# Patient Record
Sex: FEMALE | Race: BLACK OR AFRICAN AMERICAN | NOT HISPANIC OR LATINO | URBAN - METROPOLITAN AREA
[De-identification: names, ages, dates, MRNs, and addresses within clinical notes are randomized per-mention and may not be internally consistent; named-entity substitution may affect disease eponyms.]

---

## 2022-03-27 ENCOUNTER — INPATIENT (INPATIENT)
Facility: HOSPITAL | Age: 47
LOS: 3 days | Discharge: HOME | DRG: 312 | End: 2022-03-31
Attending: INTERNAL MEDICINE | Admitting: INTERNAL MEDICINE
Payer: MEDICARE

## 2022-03-27 VITALS
RESPIRATION RATE: 18 BRPM | TEMPERATURE: 98 F | DIASTOLIC BLOOD PRESSURE: 95 MMHG | OXYGEN SATURATION: 100 % | HEART RATE: 106 BPM | HEIGHT: 66 IN | SYSTOLIC BLOOD PRESSURE: 151 MMHG | WEIGHT: 175.05 LBS

## 2022-03-27 LAB
AMPHET UR-MCNC: NEGATIVE — SIGNIFICANT CHANGE UP
ANION GAP SERPL CALC-SCNC: 9 MMOL/L — SIGNIFICANT CHANGE UP (ref 9–16)
BARBITURATES UR SCN-MCNC: NEGATIVE — SIGNIFICANT CHANGE UP
BASOPHILS # BLD AUTO: 0.02 K/UL — SIGNIFICANT CHANGE UP (ref 0–0.2)
BASOPHILS NFR BLD AUTO: 0.2 % — SIGNIFICANT CHANGE UP (ref 0–2)
BENZODIAZ UR-MCNC: NEGATIVE — SIGNIFICANT CHANGE UP
BUN SERPL-MCNC: 12 MG/DL — SIGNIFICANT CHANGE UP (ref 7–23)
CALCIUM SERPL-MCNC: 9 MG/DL — SIGNIFICANT CHANGE UP (ref 8.5–10.5)
CHLORIDE SERPL-SCNC: 106 MMOL/L — SIGNIFICANT CHANGE UP (ref 96–108)
CO2 SERPL-SCNC: 26 MMOL/L — SIGNIFICANT CHANGE UP (ref 22–31)
COCAINE METAB.OTHER UR-MCNC: NEGATIVE — SIGNIFICANT CHANGE UP
CREAT SERPL-MCNC: 0.96 MG/DL — SIGNIFICANT CHANGE UP (ref 0.5–1.3)
D DIMER BLD IA.RAPID-MCNC: 607 NG/ML DDU — HIGH
EGFR: 73 ML/MIN/1.73M2 — SIGNIFICANT CHANGE UP
EOSINOPHIL # BLD AUTO: 0.02 K/UL — SIGNIFICANT CHANGE UP (ref 0–0.5)
EOSINOPHIL NFR BLD AUTO: 0.2 % — SIGNIFICANT CHANGE UP (ref 0–6)
ETHANOL SERPL-MCNC: <3 MG/DL — SIGNIFICANT CHANGE UP
GLUCOSE SERPL-MCNC: 102 MG/DL — HIGH (ref 70–99)
HCG SERPL-ACNC: <1 MIU/ML — SIGNIFICANT CHANGE UP
HCT VFR BLD CALC: 31.2 % — LOW (ref 34.5–45)
HGB BLD-MCNC: 9.6 G/DL — LOW (ref 11.5–15.5)
IMM GRANULOCYTES NFR BLD AUTO: 0.2 % — SIGNIFICANT CHANGE UP (ref 0–1.5)
LYMPHOCYTES # BLD AUTO: 1.1 K/UL — SIGNIFICANT CHANGE UP (ref 1–3.3)
LYMPHOCYTES # BLD AUTO: 11.7 % — LOW (ref 13–44)
MAGNESIUM SERPL-MCNC: 1.8 MG/DL — SIGNIFICANT CHANGE UP (ref 1.6–2.6)
MCHC RBC-ENTMCNC: 27.7 PG — SIGNIFICANT CHANGE UP (ref 27–34)
MCHC RBC-ENTMCNC: 30.8 GM/DL — LOW (ref 32–36)
MCV RBC AUTO: 90.2 FL — SIGNIFICANT CHANGE UP (ref 80–100)
METHADONE UR-MCNC: NEGATIVE — SIGNIFICANT CHANGE UP
MONOCYTES # BLD AUTO: 0.59 K/UL — SIGNIFICANT CHANGE UP (ref 0–0.9)
MONOCYTES NFR BLD AUTO: 6.3 % — SIGNIFICANT CHANGE UP (ref 2–14)
NEUTROPHILS # BLD AUTO: 7.66 K/UL — HIGH (ref 1.8–7.4)
NEUTROPHILS NFR BLD AUTO: 81.4 % — HIGH (ref 43–77)
NRBC # BLD: 0 /100 WBCS — SIGNIFICANT CHANGE UP (ref 0–0)
NT-PROBNP SERPL-SCNC: 120 PG/ML — SIGNIFICANT CHANGE UP
OPIATES UR-MCNC: NEGATIVE — SIGNIFICANT CHANGE UP
PCP SPEC-MCNC: SIGNIFICANT CHANGE UP
PCP UR-MCNC: NEGATIVE — SIGNIFICANT CHANGE UP
PLATELET # BLD AUTO: 207 K/UL — SIGNIFICANT CHANGE UP (ref 150–400)
POTASSIUM SERPL-MCNC: 3.4 MMOL/L — LOW (ref 3.5–5.3)
POTASSIUM SERPL-SCNC: 3.4 MMOL/L — LOW (ref 3.5–5.3)
RBC # BLD: 3.46 M/UL — LOW (ref 3.8–5.2)
RBC # FLD: 15.4 % — HIGH (ref 10.3–14.5)
SARS-COV-2 RNA SPEC QL NAA+PROBE: SIGNIFICANT CHANGE UP
SODIUM SERPL-SCNC: 141 MMOL/L — SIGNIFICANT CHANGE UP (ref 132–145)
THC UR QL: NEGATIVE — SIGNIFICANT CHANGE UP
TROPONIN I, HIGH SENSITIVITY RESULT: 13 NG/L — SIGNIFICANT CHANGE UP
TROPONIN I, HIGH SENSITIVITY RESULT: 14.8 NG/L — SIGNIFICANT CHANGE UP
TSH SERPL-MCNC: 2.48 UIU/ML — SIGNIFICANT CHANGE UP (ref 0.36–3.74)
WBC # BLD: 9.41 K/UL — SIGNIFICANT CHANGE UP (ref 3.8–10.5)
WBC # FLD AUTO: 9.41 K/UL — SIGNIFICANT CHANGE UP (ref 3.8–10.5)

## 2022-03-27 PROCEDURE — 71045 X-RAY EXAM CHEST 1 VIEW: CPT | Mod: 26

## 2022-03-27 PROCEDURE — 71275 CT ANGIOGRAPHY CHEST: CPT | Mod: 26,MH

## 2022-03-27 PROCEDURE — 93010 ELECTROCARDIOGRAM REPORT: CPT

## 2022-03-27 PROCEDURE — 70450 CT HEAD/BRAIN W/O DYE: CPT | Mod: 26,MH

## 2022-03-27 PROCEDURE — 99223 1ST HOSP IP/OBS HIGH 75: CPT

## 2022-03-27 PROCEDURE — 99285 EMERGENCY DEPT VISIT HI MDM: CPT | Mod: 25

## 2022-03-27 RX ORDER — POTASSIUM CHLORIDE 20 MEQ
40 PACKET (EA) ORAL ONCE
Refills: 0 | Status: COMPLETED | OUTPATIENT
Start: 2022-03-27 | End: 2022-03-27

## 2022-03-27 RX ORDER — POTASSIUM CHLORIDE 20 MEQ
10 PACKET (EA) ORAL ONCE
Refills: 0 | Status: COMPLETED | OUTPATIENT
Start: 2022-03-27 | End: 2022-03-27

## 2022-03-27 RX ADMIN — Medication 100 MILLIEQUIVALENT(S): at 20:59

## 2022-03-27 RX ADMIN — Medication 40 MILLIEQUIVALENT(S): at 20:58

## 2022-03-27 NOTE — ED ADULT TRIAGE NOTE - CHIEF COMPLAINT QUOTE
Pt BIBEMS from home complaining of syncope and dizziness. Pt with pmh of cardiomyopathy and lupus. Pt with baseline left sided weakness uses a walked to ambulate.

## 2022-03-27 NOTE — ED PROVIDER NOTE - PHYSICAL EXAMINATION
Constitutional: awake and alert, shivering and distressed appearing  HEENT: head normocephalic and atraumatic. moist mucous membranes  Eyes: extraocular movements intact, normal conjunctiva  Neck: supple, normal ROM  Cardiovascular: regular rhythm, slightly tachycardic, no murmur  Pulmonary: mildly short of breath lungs clear to auscultation bilaterally  Gastrointestinal: abdomen soft, nontender, nondistended, no rebound or guarding  Genitourinary: no CVA tenderness  Skin: warm, dry, normal for ethnicity  Musculoskeletal: no edema, no deformity  Neurological: oriented x4, 5/5 strength in all 4 extremities, sensation intact in all extremities. CN II-XII intact. no focal neurologic deficit  Psychiatric: appropriate mood and affect

## 2022-03-27 NOTE — ED PROVIDER NOTE - CLINICAL SUMMARY MEDICAL DECISION MAKING FREE TEXT BOX
Patient brought to the ED after being found unconscious in bathroom for what sounds like several hours, syncope vs seizure vs arrhythmia. She has a complex past medical history, much of which has been worked up at other hospitals in [facility], but it sounds like there's been some diagnostic uncertainty surrounding her underlying conditions. She is baseline tachycardic, slightly tachypneic, afebrile, but shivering violently upon arrival. Extensive workup with labs, EKG, CT head, XR chest ordered and will plan to admit for further monitoring and investigations.

## 2022-03-27 NOTE — ED PROVIDER NOTE - LAB INTERPRETATION
significant anemia. unknown if this is baseline for the patient. d-dimer elevated. mild hypokalemia. negative troponin
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
## 2022-03-27 NOTE — H&P ADULT - NSICDXPASTMEDICALHX_GEN_ALL_CORE_FT
PAST MEDICAL HISTORY:  Anemia     Asthma     Hypothyroidism     Lupus erythematosus     Neuropathy

## 2022-03-27 NOTE — H&P ADULT - NSHPSOCIALHISTORY_GEN_ALL_CORE
-never smoker, denies ETOH or illicits  -currently on disability, but used to be a  at [employer]  -lives alone, ambulates with a walker in public

## 2022-03-27 NOTE — H&P ADULT - PROBLEM SELECTOR PLAN 1
-pt reports long history of fainting/near-fainting episodes for several years, occurring nearly every week, described as a prodrome of dizziness, lightheadedness, palpitations, and shortness of breath that requires her to lay flat. Denies any prior cardiac work-up  -CTA chest PE protocol negative for central PE (Motion artifact limits evaluation of the bilateral segmental and subsegmental pulmonary arteries)  -Head CT negative  -Trop negative x2, EKG (per ER provider note, as EKG not provided from [facility]) sinus tach with non-specific repol abnormality  -monitor tele  -echo ordered  -f/u LE duplex  -f/u orthostatics -pt reports long history of fainting/near-fainting episodes for several years, occurring nearly every week, described as a prodrome of dizziness, lightheadedness, palpitations, and shortness of breath that requires her to lay flat. Denies any prior cardiac work-up, denies seizure hx or drug/ETOH use  -CTA chest PE protocol negative for central PE (Motion artifact limits evaluation of the bilateral segmental and subsegmental pulmonary arteries)  -Head CT negative  -Trop negative x2, EKG (per ER provider note, as EKG not provided from [facility]) sinus tach with non-specific repol abnormality  -UTOX negative  -monitor tele  -echo ordered  -f/u LE duplex  -f/u orthostatics

## 2022-03-27 NOTE — H&P ADULT - PROBLEM SELECTOR PLAN 2
-hgb 9.6 on arrival, unknown baseline  -pt reports having "genetic" anemia, had to take iron supplements as a child and states she doesn't absorb iron well. Denies sickle cell or thalassemia  -denies melena but reports occasional minimal blood in stool  -f/u iron studies, B12  -outpatient collateral

## 2022-03-27 NOTE — H&P ADULT - ASSESSMENT
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
## 2022-03-27 NOTE — PATIENT PROFILE ADULT - FALL HARM RISK - HARM RISK INTERVENTIONS

## 2022-03-27 NOTE — H&P ADULT - NSHPPHYSICALEXAM_GEN_ALL_CORE
Vital Signs Last 24 Hrs  T(C): 37.4 (27 Mar 2022 23:04), Max: 37.4 (27 Mar 2022 23:04)  T(F): 99.3 (27 Mar 2022 23:04), Max: 99.3 (27 Mar 2022 23:04)  HR: 88 (28 Mar 2022 00:20) (88 - 106)  BP: 131/87 (28 Mar 2022 00:20) (131/87 - 151/95)  RR: 18 (28 Mar 2022 00:20) (18 - 20)  SpO2: 98% (28 Mar 2022 00:20) (98% - 100%)

## 2022-03-27 NOTE — H&P ADULT - NSICDXFAMILYHX_GEN_ALL_CORE_FT
FAMILY HISTORY:  Mother  Still living? Unknown  Family history of cardiomyopathy, Age at diagnosis: 71-80    Sibling  Still living? Unknown  Family history of cardiomyopathy, Age at diagnosis: 31-40

## 2022-03-28 DIAGNOSIS — E89.0 POSTPROCEDURAL HYPOTHYROIDISM: Chronic | ICD-10-CM

## 2022-03-28 DIAGNOSIS — D64.9 ANEMIA, UNSPECIFIED: ICD-10-CM

## 2022-03-28 DIAGNOSIS — R55 SYNCOPE AND COLLAPSE: ICD-10-CM

## 2022-03-28 DIAGNOSIS — G62.9 POLYNEUROPATHY, UNSPECIFIED: ICD-10-CM

## 2022-03-28 DIAGNOSIS — L93.0 DISCOID LUPUS ERYTHEMATOSUS: ICD-10-CM

## 2022-03-28 DIAGNOSIS — E03.9 HYPOTHYROIDISM, UNSPECIFIED: ICD-10-CM

## 2022-03-28 DIAGNOSIS — Z29.9 ENCOUNTER FOR PROPHYLACTIC MEASURES, UNSPECIFIED: ICD-10-CM

## 2022-03-28 LAB
ALBUMIN SERPL ELPH-MCNC: 3.6 G/DL — SIGNIFICANT CHANGE UP (ref 3.3–5)
ALP SERPL-CCNC: 57 U/L — SIGNIFICANT CHANGE UP (ref 40–120)
ALT FLD-CCNC: 13 U/L — SIGNIFICANT CHANGE UP (ref 10–45)
ANION GAP SERPL CALC-SCNC: 10 MMOL/L — SIGNIFICANT CHANGE UP (ref 5–17)
AST SERPL-CCNC: 20 U/L — SIGNIFICANT CHANGE UP (ref 10–40)
BILIRUB SERPL-MCNC: 0.4 MG/DL — SIGNIFICANT CHANGE UP (ref 0.2–1.2)
BUN SERPL-MCNC: 8 MG/DL — SIGNIFICANT CHANGE UP (ref 7–23)
CALCIUM SERPL-MCNC: 8.8 MG/DL — SIGNIFICANT CHANGE UP (ref 8.4–10.5)
CHLORIDE SERPL-SCNC: 106 MMOL/L — SIGNIFICANT CHANGE UP (ref 96–108)
CHOLEST SERPL-MCNC: 146 MG/DL — SIGNIFICANT CHANGE UP
CO2 SERPL-SCNC: 21 MMOL/L — LOW (ref 22–31)
COVID-19 NUCLEOCAPSID GAM AB INTERP: NEGATIVE — SIGNIFICANT CHANGE UP
COVID-19 NUCLEOCAPSID TOTAL GAM ANTIBODY RESULT: 0.21 INDEX — SIGNIFICANT CHANGE UP
CREAT SERPL-MCNC: 0.86 MG/DL — SIGNIFICANT CHANGE UP (ref 0.5–1.3)
EGFR: 83 ML/MIN/1.73M2 — SIGNIFICANT CHANGE UP
FERRITIN SERPL-MCNC: 11 NG/ML — LOW (ref 15–150)
GLUCOSE SERPL-MCNC: 103 MG/DL — HIGH (ref 70–99)
HAPTOGLOB SERPL-MCNC: 107 MG/DL — SIGNIFICANT CHANGE UP (ref 34–200)
HCT VFR BLD CALC: 29.3 % — LOW (ref 34.5–45)
HDLC SERPL-MCNC: 58 MG/DL — SIGNIFICANT CHANGE UP
HGB BLD-MCNC: 8.8 G/DL — LOW (ref 11.5–15.5)
IRON SATN MFR SERPL: 17 % — SIGNIFICANT CHANGE UP (ref 14–50)
IRON SATN MFR SERPL: 59 UG/DL — SIGNIFICANT CHANGE UP (ref 30–160)
LDH SERPL L TO P-CCNC: 157 U/L — SIGNIFICANT CHANGE UP (ref 50–242)
LIPID PNL WITH DIRECT LDL SERPL: 70 MG/DL — SIGNIFICANT CHANGE UP
MAGNESIUM SERPL-MCNC: 2 MG/DL — SIGNIFICANT CHANGE UP (ref 1.6–2.6)
MCHC RBC-ENTMCNC: 27.8 PG — SIGNIFICANT CHANGE UP (ref 27–34)
MCHC RBC-ENTMCNC: 30 GM/DL — LOW (ref 32–36)
MCV RBC AUTO: 92.4 FL — SIGNIFICANT CHANGE UP (ref 80–100)
NON HDL CHOLESTEROL: 88 MG/DL — SIGNIFICANT CHANGE UP
NRBC # BLD: 0 /100 WBCS — SIGNIFICANT CHANGE UP (ref 0–0)
PLATELET # BLD AUTO: 209 K/UL — SIGNIFICANT CHANGE UP (ref 150–400)
POTASSIUM SERPL-MCNC: 3.5 MMOL/L — SIGNIFICANT CHANGE UP (ref 3.5–5.3)
POTASSIUM SERPL-SCNC: 3.5 MMOL/L — SIGNIFICANT CHANGE UP (ref 3.5–5.3)
PROT SERPL-MCNC: 6.7 G/DL — SIGNIFICANT CHANGE UP (ref 6–8.3)
RBC # BLD: 3.17 M/UL — LOW (ref 3.8–5.2)
RBC # FLD: 15.5 % — HIGH (ref 10.3–14.5)
RETICS #: 74.5 K/UL — SIGNIFICANT CHANGE UP (ref 25–125)
RETICS/RBC NFR: 2.4 % — SIGNIFICANT CHANGE UP (ref 0.5–2.5)
SARS-COV-2 IGG+IGM SERPL QL IA: 0.21 INDEX — SIGNIFICANT CHANGE UP
SARS-COV-2 IGG+IGM SERPL QL IA: NEGATIVE — SIGNIFICANT CHANGE UP
SODIUM SERPL-SCNC: 137 MMOL/L — SIGNIFICANT CHANGE UP (ref 135–145)
T4 AB SER-ACNC: 6.75 UG/DL — SIGNIFICANT CHANGE UP (ref 4.5–11.7)
T4 FREE SERPL-MCNC: 0.92 NG/DL — LOW (ref 0.93–1.7)
TIBC SERPL-MCNC: 357 UG/DL — SIGNIFICANT CHANGE UP (ref 220–430)
TRIGL SERPL-MCNC: 88 MG/DL — SIGNIFICANT CHANGE UP
TSH SERPL-MCNC: 2.11 UIU/ML — SIGNIFICANT CHANGE UP (ref 0.27–4.2)
UIBC SERPL-MCNC: 298 UG/DL — SIGNIFICANT CHANGE UP (ref 110–370)
VIT B12 SERPL-MCNC: 546 PG/ML — SIGNIFICANT CHANGE UP (ref 232–1245)
WBC # BLD: 8.48 K/UL — SIGNIFICANT CHANGE UP (ref 3.8–10.5)
WBC # FLD AUTO: 8.48 K/UL — SIGNIFICANT CHANGE UP (ref 3.8–10.5)

## 2022-03-28 PROCEDURE — 99233 SBSQ HOSP IP/OBS HIGH 50: CPT

## 2022-03-28 PROCEDURE — 93010 ELECTROCARDIOGRAM REPORT: CPT

## 2022-03-28 PROCEDURE — 93970 EXTREMITY STUDY: CPT | Mod: 26

## 2022-03-28 PROCEDURE — 99222 1ST HOSP IP/OBS MODERATE 55: CPT

## 2022-03-28 PROCEDURE — 93306 TTE W/DOPPLER COMPLETE: CPT | Mod: 26

## 2022-03-28 RX ORDER — POTASSIUM CHLORIDE 20 MEQ
40 PACKET (EA) ORAL ONCE
Refills: 0 | Status: COMPLETED | OUTPATIENT
Start: 2022-03-28 | End: 2022-03-28

## 2022-03-28 RX ORDER — LEVOTHYROXINE SODIUM 125 MCG
125 TABLET ORAL EVERY 24 HOURS
Refills: 0 | Status: DISCONTINUED | OUTPATIENT
Start: 2022-03-28 | End: 2022-03-31

## 2022-03-28 RX ORDER — IRON SUCROSE 20 MG/ML
300 INJECTION, SOLUTION INTRAVENOUS EVERY 24 HOURS
Refills: 0 | Status: COMPLETED | OUTPATIENT
Start: 2022-03-28 | End: 2022-03-30

## 2022-03-28 RX ORDER — GABAPENTIN 400 MG/1
300 CAPSULE ORAL DAILY
Refills: 0 | Status: DISCONTINUED | OUTPATIENT
Start: 2022-03-28 | End: 2022-03-31

## 2022-03-28 RX ORDER — BUPROPION HYDROCHLORIDE 150 MG/1
100 TABLET, EXTENDED RELEASE ORAL DAILY
Refills: 0 | Status: DISCONTINUED | OUTPATIENT
Start: 2022-03-28 | End: 2022-03-31

## 2022-03-28 RX ORDER — BUPROPION HYDROCHLORIDE 150 MG/1
1 TABLET, EXTENDED RELEASE ORAL
Qty: 0 | Refills: 0 | DISCHARGE

## 2022-03-28 RX ORDER — FLUTICASONE PROPIONATE AND SALMETEROL 50; 250 UG/1; UG/1
1 POWDER ORAL; RESPIRATORY (INHALATION)
Qty: 0 | Refills: 0 | DISCHARGE

## 2022-03-28 RX ORDER — ACETAMINOPHEN 500 MG
1000 TABLET ORAL ONCE
Refills: 0 | Status: COMPLETED | OUTPATIENT
Start: 2022-03-28 | End: 2022-03-28

## 2022-03-28 RX ORDER — ENOXAPARIN SODIUM 100 MG/ML
40 INJECTION SUBCUTANEOUS EVERY 24 HOURS
Refills: 0 | Status: DISCONTINUED | OUTPATIENT
Start: 2022-03-28 | End: 2022-03-31

## 2022-03-28 RX ORDER — ALBUTEROL 90 UG/1
2 AEROSOL, METERED ORAL
Qty: 0 | Refills: 0 | DISCHARGE

## 2022-03-28 RX ORDER — SENNA PLUS 8.6 MG/1
2 TABLET ORAL AT BEDTIME
Refills: 0 | Status: DISCONTINUED | OUTPATIENT
Start: 2022-03-28 | End: 2022-03-31

## 2022-03-28 RX ORDER — INFLUENZA VIRUS VACCINE 15; 15; 15; 15 UG/.5ML; UG/.5ML; UG/.5ML; UG/.5ML
0.5 SUSPENSION INTRAMUSCULAR ONCE
Refills: 0 | Status: DISCONTINUED | OUTPATIENT
Start: 2022-03-28 | End: 2022-03-31

## 2022-03-28 RX ORDER — BUPROPION HYDROCHLORIDE 150 MG/1
150 TABLET, EXTENDED RELEASE ORAL DAILY
Refills: 0 | Status: DISCONTINUED | OUTPATIENT
Start: 2022-03-28 | End: 2022-03-28

## 2022-03-28 RX ORDER — LEVOTHYROXINE SODIUM 125 MCG
1 TABLET ORAL
Qty: 0 | Refills: 0 | DISCHARGE

## 2022-03-28 RX ORDER — POLYETHYLENE GLYCOL 3350 17 G/17G
17 POWDER, FOR SOLUTION ORAL DAILY
Refills: 0 | Status: DISCONTINUED | OUTPATIENT
Start: 2022-03-28 | End: 2022-03-31

## 2022-03-28 RX ORDER — FERROUS SULFATE 325(65) MG
85 TABLET ORAL
Qty: 0 | Refills: 0 | DISCHARGE

## 2022-03-28 RX ORDER — GABAPENTIN 400 MG/1
1 CAPSULE ORAL
Qty: 0 | Refills: 0 | DISCHARGE

## 2022-03-28 RX ADMIN — SENNA PLUS 2 TABLET(S): 8.6 TABLET ORAL at 21:13

## 2022-03-28 RX ADMIN — ENOXAPARIN SODIUM 40 MILLIGRAM(S): 100 INJECTION SUBCUTANEOUS at 21:13

## 2022-03-28 RX ADMIN — Medication 125 MICROGRAM(S): at 08:30

## 2022-03-28 RX ADMIN — Medication 40 MILLIEQUIVALENT(S): at 14:33

## 2022-03-28 RX ADMIN — POLYETHYLENE GLYCOL 3350 17 GRAM(S): 17 POWDER, FOR SOLUTION ORAL at 19:27

## 2022-03-28 RX ADMIN — Medication 1000 MILLIGRAM(S): at 16:10

## 2022-03-28 RX ADMIN — BUPROPION HYDROCHLORIDE 100 MILLIGRAM(S): 150 TABLET, EXTENDED RELEASE ORAL at 14:32

## 2022-03-28 RX ADMIN — IRON SUCROSE 176.67 MILLIGRAM(S): 20 INJECTION, SOLUTION INTRAVENOUS at 14:32

## 2022-03-28 RX ADMIN — GABAPENTIN 300 MILLIGRAM(S): 400 CAPSULE ORAL at 06:45

## 2022-03-28 RX ADMIN — Medication 1000 MILLIGRAM(S): at 15:10

## 2022-03-28 NOTE — CONSULT NOTE ADULT - ATTENDING COMMENTS
It is unclear if there is one unifying diagnosis for her issues.  exam also difficult to interpret due to give-way weakness on confrontational testing.  her right hand and arm symptoms could be ulnar neuropathy but there is some APB wasting, so it could also be C8/T1 radiculopathy.  leg symptoms not meeting any clear nerve or root distribution.  EMG/NCS was ordered outpatient which should clarify these questions.  This is not needed during this hospitalization.  In light of non-localizing exam, suggested MRIs of the CNS to r/o MS.

## 2022-03-28 NOTE — PHYSICAL THERAPY INITIAL EVALUATION ADULT - IMPAIRMENTS FOUND, PT EVAL
aerobic capacity/endurance/gait, locomotion, and balance/gross motor/integumentary integrity/joint integrity and mobility/muscle strength/poor safety awareness/posture/ROM/tone
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
## 2022-03-28 NOTE — PHYSICAL THERAPY INITIAL EVALUATION ADULT - GENERAL OBSERVATIONS, REHAB EVAL
Pt received supine with HOB elevated in NAD, call bell in reach, cleared for PT IE by RN, pt agreeable.

## 2022-03-28 NOTE — PROGRESS NOTE ADULT - ASSESSMENT
47 y/o female with PMHx lupus, acquired hypothyroidism 2/2 thyroidectomy, "genetic" anemia (denies sickle cell/thalassemia, unknown baseline hgb), asthma, lupus, chronic LLE neuropathy (uses a walker), LUE ulnar neuropathy 2/2 trauma, depression/anxiety, hx of pre-syncope/syncope for >10 years, who was brought to [facility] ED by EMS from home on 3/27 after she was found unconscious in her bathroom, now admitted to cardiac tele for syncope work-up.

## 2022-03-28 NOTE — PROGRESS NOTE ADULT - ATTENDING COMMENTS
46F w/ pmh of SLE, Chronic Anemia(?Thalassemia), Asthma, Hypothyroidism and chronic neuropathy p/w pre-syncope/syncope    -Syncope - Unclear etiology at this time - possibly orthostasis vs symptomatic anemia; Tele w/ NSR to Sinus Tach; Obtain TTE, c/w Telemetry monitoring; Currently HD stable  -Heme - h/o chronic anemia since childhood, previously on Iron supplementation; Hbg 9.6 on admission ->8.8 today; No evidence of active bleeding; Start IV darien sucrose 300mg x 3days; Heme consult  -Neuro - Severe chronic LE neuropathy - pt. requires assistance w/ ambulation; Neurology consulted  -DASH diet  -DVT PPx  -Dispo: Cardiac Telemetry    Smith Jessica MD  Cardiology Attending

## 2022-03-28 NOTE — CONSULT NOTE ADULT - SUBJECTIVE AND OBJECTIVE BOX
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 bulk and tone. No involuntary movements (tremor, myoclonus, chorea, athetosis, dystonia). Motor exam is limited by patient pain at L hip           Upper extremities: shoulder abduction 5/5, elbow flexion/extension 4/5 LUE 5/5 RUE, wrist flexion/extension 3/5 LUE 5/5 RUE, handgrip 3/5 LUE 4/5 RUE          Lower extremities: hip flexion 3/5 LLE 5/5 RLE, knee extension/flexion 3/5 LLE 5/5 RLE, plantar flexion 3/5 LLE 5/5 RLE, ankle dorsiflexion 3/5 LLE, 5/5 RLE          No pronator drift. Rapid alternating movements intact and symmetric. Contractures of L 4th and 5th digits. Thenar and hypothenar wasting of L hand.     -Sensation: Intact to light touch both distally and proximally however, does feel sensation in less over LLE.           No neglect or extinction on double simultaneous testing.     -Coordination: No dysmetria on finger-to-nose and heel-to-shin intact bilaterally, rapid alternating hand movements intact     -Reflexes: 2+ and symmetric at biceps, 3+ RLE 2+ LLE patellar, 2+ ankles          Plantar reflexes downgoing bilaterally. Downgoing toes bilaterally            LABS:                        8.8    8.48  )-----------( 209      ( 28 Mar 2022 08:07 )             29.3         137  |  106  |  8   ----------------------------<  103<H>  3.5   |  21<L>  |  0.86    Ca    8.8      28 Mar 2022 08:07  Mg     2.0         TPro  6.7  /  Alb  3.6  /  TBili  0.4  /  DBili  x   /  AST  20  /  ALT  13  /  AlkPhos  57      Hemoglobin A1C:   Vitamin B12 Vitamin B12, Serum: 546 pg/mL ( @ 12:26)      CAPILLARY BLOOD GLUCOSE      POCT Blood Glucose.: 117 mg/dL (27 Mar 2022 16:24)              Microbiology:      RADIOLOGY, EKG AND ADDITIONAL TESTS: Reviewed.

## 2022-03-28 NOTE — PROGRESS NOTE ADULT - SUBJECTIVE AND OBJECTIVE BOX
INTERVAL HPI/OVERNIGHT EVENTS:  O/N: TATY, Sinus tachy on tele overnight     This morning: Patient was seen and examined at bedside. Complaining of right leg numbness and weakness, left arm pain and numbness along medial 2.5 fingers, notices some blurry vision. Worse in the cold.     VITAL SIGNS:  T(F): 98.4 (03-28-22 @ 09:29)  HR: 102 (03-28-22 @ 06:15)  BP: 129/84 (03-28-22 @ 06:15)  RR: 18 (03-28-22 @ 09:32)  SpO2: 100% (03-28-22 @ 09:32)    PHYSICAL EXAM:    Constitutional: NAD  HEENT: PERRL, EOMI, sclera non-icteric, neck supple, trachea midline, no masses, no JVD, MMM, good dentition  Respiratory: CTA b/l, good air entry b/l, no wheezing, no rhonchi, no rales, without accessory muscle use and no intercostal retractions  Cardiovascular: RRR, normal S1S2, no M/R/G  Gastrointestinal: abdomen soft, NTND, no masses palpable, BS normal  Extremities: Warm, well perfused, pulses equal bilateral upper and lower extremities, no edema, no clubbing  Neurological: AAOx3, CN Grossly intact, patients left foot is 3/5 with plantar/dorsiflexion, knee extension 3/5 and thigh flexion 3/5. 5/5 on right side. diminished sensaion along left thigh, worst posteriorly. Left 4 and 5th fingers with weakness and numbness.   Skin: Normal temperature, warm, dry    MEDICATIONS  (STANDING):  buPROPion SR (12-Hour) 100 milliGRAM(s) Oral daily  gabapentin 300 milliGRAM(s) Oral daily  influenza   Vaccine 0.5 milliLiter(s) IntraMuscular once  iron sucrose IVPB 300 milliGRAM(s) IV Intermittent every 24 hours  levothyroxine 125 MICROGram(s) Oral every 24 hours  potassium chloride    Tablet ER 40 milliEquivalent(s) Oral once    Allergies    ibuprofen (Unknown)    Intolerances    LABS:                        8.8    8.48  )-----------( 209      ( 28 Mar 2022 08:07 )             29.3     03-28    137  |  106  |  8   ----------------------------<  103<H>  3.5   |  21<L>  |  0.86    Ca    8.8      28 Mar 2022 08:07  Mg     2.0     03-28    TPro  6.7  /  Alb  3.6  /  TBili  0.4  /  DBili  x   /  AST  20  /  ALT  13  /  AlkPhos  57  03-28    RADIOLOGY & ADDITIONAL TESTS:  Reviewed

## 2022-03-28 NOTE — CONSULT NOTE ADULT - ASSESSMENT
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 Hem-Onc consulted for anemia.    Chronic anemia  Hgb 8.8, MCV 92.4, Iron % saturation 17 TIBC 357 B12 546  Pt with reported chronic iron deficiency anemia, on iron supplementation since childhood. Likely multifactorial etiology, including anemia of chronic disease (hx of lupus, untreated for 4-5 years). No signs of acute or insidious blood loss and reportedly has never required transfusions. She is not on any medications that could induce anemia. No reported history of alcohol use or liver disease, (LFTs wnl). Has acquired hypothyroidism on synthroid. No other blood cell lineage abnormalities.  Recommendations:  -Check folate level  -check hemolysis labs: haptoglobin, LDH, Reticulocyte count  -continue with iron supplementation along with bowel regimen to treat constipation

## 2022-03-28 NOTE — PHYSICAL THERAPY INITIAL EVALUATION ADULT - PERTINENT HX OF CURRENT PROBLEM, REHAB EVAL
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

## 2022-03-28 NOTE — PHYSICAL THERAPY INITIAL EVALUATION ADULT - ADDITIONAL COMMENTS
Pt lives in an apartment alone, Pt lives in an apartment alone, ambulates with rolling walker at baseline, pt reports she would need a new walker when discharged. Pt reports hx of facial paralysis left ue numbness and weakness and left le weakness, pt reports that sx are improved after eating breakfast and waking up more. Pt unable to report onset of sx, 6 months, maybe a year, sometime in 2021. pt reports she never reported facial paralysis. Pt reports she ambulates with rolling walker at home, and is independent with ADLS and IADLS, however it is becoming more and more difficult due to numbness and weakness

## 2022-03-28 NOTE — CONSULT NOTE ADULT - ASSESSMENT
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     #r/o MS  Pt with longstanding LLE neuropathy she believes began in 2008 and has progressed till now. Unable to extend LLE on exam with decreased strength of LLE on exam and reduced sensation over LLE. Pt began outpatient work up with a  and neurologist outpatient for work up of MS as she occasionally has intermittent blurry vision as well. However states she has not undergone any imaging or EMG testing yet 2/2 to difficulty with scheduling/pandemic. Does not recall name of neurologists who evaluated her.   - Order MR Brain, Cervical, and Thoracic spine w/ and w/o contrast.    #Ulnar Neuropathy  Pt with ulnar nerve damage since fall 2021 2/2 to trauma. Diagnosed by hand surgeon outpatient.   - C/w gabapentin 300mg     Discussed with attending, Dr. William.  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     #r/o MS  Pt with longstanding LLE neuropathy she believes began in 2008 and has progressed till now. Unable to extend LLE on exam with decreased strength of LLE on exam and reduced sensation over LLE. Pt began outpatient work up with a  and neurologist outpatient for work up of MS as she occasionally has intermittent blurry vision as well. However states she has not undergone any imaging or EMG testing yet 2/2 to difficulty with scheduling/pandemic. Does not recall name of neurologists who evaluated her.   - Order MR Brain, Cervical, and Thoracic spine w/ and w/o contrast.  - MR should not hold up patient discharge if not done inpatient, she should follow up with her neurologist at [facility] for further work up and testing upon discharge.    #Ulnar Neuropathy   Pt with ulnar nerve damage since fall 2021 2/2 to trauma. However does experience pain and weakness along LUE. Could possibly be C8 radiculopathy vs. ulnar nerve damage.  - C/w gabapentin 300mg     Discussed with attending, Dr. William.

## 2022-03-28 NOTE — PHYSICAL THERAPY INITIAL EVALUATION ADULT - MANUAL MUSCLE TESTING RESULTS, REHAB EVAL
RUE grossly 3+/5 MMT LUE grossly 3-/5 MMT RLE grossly 3+/5 MMT LLE hip flexion 3-/5 knee flexion and extension 3/5 ankle DF 2/5.

## 2022-03-29 DIAGNOSIS — D50.9 IRON DEFICIENCY ANEMIA, UNSPECIFIED: ICD-10-CM

## 2022-03-29 LAB
ALBUMIN SERPL ELPH-MCNC: 3.8 G/DL — SIGNIFICANT CHANGE UP (ref 3.3–5)
ALP SERPL-CCNC: 56 U/L — SIGNIFICANT CHANGE UP (ref 40–120)
ALT FLD-CCNC: 15 U/L — SIGNIFICANT CHANGE UP (ref 10–45)
ANION GAP SERPL CALC-SCNC: 11 MMOL/L — SIGNIFICANT CHANGE UP (ref 5–17)
AST SERPL-CCNC: 21 U/L — SIGNIFICANT CHANGE UP (ref 10–40)
BASOPHILS # BLD AUTO: 0.02 K/UL — SIGNIFICANT CHANGE UP (ref 0–0.2)
BASOPHILS NFR BLD AUTO: 0.3 % — SIGNIFICANT CHANGE UP (ref 0–2)
BILIRUB SERPL-MCNC: 0.3 MG/DL — SIGNIFICANT CHANGE UP (ref 0.2–1.2)
BUN SERPL-MCNC: 9 MG/DL — SIGNIFICANT CHANGE UP (ref 7–23)
CALCIUM SERPL-MCNC: 9.1 MG/DL — SIGNIFICANT CHANGE UP (ref 8.4–10.5)
CHLORIDE SERPL-SCNC: 107 MMOL/L — SIGNIFICANT CHANGE UP (ref 96–108)
CO2 SERPL-SCNC: 21 MMOL/L — LOW (ref 22–31)
CREAT SERPL-MCNC: 0.88 MG/DL — SIGNIFICANT CHANGE UP (ref 0.5–1.3)
EGFR: 81 ML/MIN/1.73M2 — SIGNIFICANT CHANGE UP
EOSINOPHIL # BLD AUTO: 0.08 K/UL — SIGNIFICANT CHANGE UP (ref 0–0.5)
EOSINOPHIL NFR BLD AUTO: 1.2 % — SIGNIFICANT CHANGE UP (ref 0–6)
FOLATE SERPL-MCNC: 6.4 NG/ML — SIGNIFICANT CHANGE UP
GLUCOSE SERPL-MCNC: 105 MG/DL — HIGH (ref 70–99)
HAPTOGLOB SERPL-MCNC: 117 MG/DL — SIGNIFICANT CHANGE UP (ref 34–200)
HCT VFR BLD CALC: 29 % — LOW (ref 34.5–45)
HGB BLD-MCNC: 8.9 G/DL — LOW (ref 11.5–15.5)
IMM GRANULOCYTES NFR BLD AUTO: 0.3 % — SIGNIFICANT CHANGE UP (ref 0–1.5)
LDH SERPL L TO P-CCNC: 147 U/L — SIGNIFICANT CHANGE UP (ref 50–242)
LYMPHOCYTES # BLD AUTO: 1.45 K/UL — SIGNIFICANT CHANGE UP (ref 1–3.3)
LYMPHOCYTES # BLD AUTO: 21.9 % — SIGNIFICANT CHANGE UP (ref 13–44)
MAGNESIUM SERPL-MCNC: 2 MG/DL — SIGNIFICANT CHANGE UP (ref 1.6–2.6)
MCHC RBC-ENTMCNC: 27.5 PG — SIGNIFICANT CHANGE UP (ref 27–34)
MCHC RBC-ENTMCNC: 30.7 GM/DL — LOW (ref 32–36)
MCV RBC AUTO: 89.5 FL — SIGNIFICANT CHANGE UP (ref 80–100)
MONOCYTES # BLD AUTO: 0.53 K/UL — SIGNIFICANT CHANGE UP (ref 0–0.9)
MONOCYTES NFR BLD AUTO: 8 % — SIGNIFICANT CHANGE UP (ref 2–14)
NEUTROPHILS # BLD AUTO: 4.53 K/UL — SIGNIFICANT CHANGE UP (ref 1.8–7.4)
NEUTROPHILS NFR BLD AUTO: 68.3 % — SIGNIFICANT CHANGE UP (ref 43–77)
NRBC # BLD: 0 /100 WBCS — SIGNIFICANT CHANGE UP (ref 0–0)
PHOSPHATE SERPL-MCNC: 3.6 MG/DL — SIGNIFICANT CHANGE UP (ref 2.5–4.5)
PLATELET # BLD AUTO: 192 K/UL — SIGNIFICANT CHANGE UP (ref 150–400)
POTASSIUM SERPL-MCNC: 3.9 MMOL/L — SIGNIFICANT CHANGE UP (ref 3.5–5.3)
POTASSIUM SERPL-SCNC: 3.9 MMOL/L — SIGNIFICANT CHANGE UP (ref 3.5–5.3)
PROT SERPL-MCNC: 6.7 G/DL — SIGNIFICANT CHANGE UP (ref 6–8.3)
RBC # BLD: 3.24 M/UL — LOW (ref 3.8–5.2)
RBC # BLD: 3.24 M/UL — LOW (ref 3.8–5.2)
RBC # FLD: 15.4 % — HIGH (ref 10.3–14.5)
RETICS #: 76.1 K/UL — SIGNIFICANT CHANGE UP (ref 25–125)
RETICS/RBC NFR: 2.4 % — SIGNIFICANT CHANGE UP (ref 0.5–2.5)
SODIUM SERPL-SCNC: 139 MMOL/L — SIGNIFICANT CHANGE UP (ref 135–145)
T3 SERPL-MCNC: 107 NG/DL — SIGNIFICANT CHANGE UP (ref 80–200)
WBC # BLD: 6.63 K/UL — SIGNIFICANT CHANGE UP (ref 3.8–10.5)
WBC # FLD AUTO: 6.63 K/UL — SIGNIFICANT CHANGE UP (ref 3.8–10.5)

## 2022-03-29 PROCEDURE — 99239 HOSP IP/OBS DSCHRG MGMT >30: CPT

## 2022-03-29 RX ADMIN — IRON SUCROSE 176.67 MILLIGRAM(S): 20 INJECTION, SOLUTION INTRAVENOUS at 14:28

## 2022-03-29 RX ADMIN — Medication 125 MICROGRAM(S): at 06:01

## 2022-03-29 RX ADMIN — ENOXAPARIN SODIUM 40 MILLIGRAM(S): 100 INJECTION SUBCUTANEOUS at 22:00

## 2022-03-29 RX ADMIN — BUPROPION HYDROCHLORIDE 100 MILLIGRAM(S): 150 TABLET, EXTENDED RELEASE ORAL at 11:24

## 2022-03-29 RX ADMIN — GABAPENTIN 300 MILLIGRAM(S): 400 CAPSULE ORAL at 11:24

## 2022-03-29 NOTE — PROGRESS NOTE ADULT - NS ATTEND AMEND GEN_ALL_CORE FT
46F w/ pmh of SLE, Chronic Anemia(?Thalassemia), Asthma, Hypothyroidism and chronic neuropathy p/w pre-syncope/syncope    -Syncope -  possibly orthostasis vs symptomatic anemia; Tele w/ NSR to Sinus Tach; TTE: Normal LV/RV function, no significant valvular or structural disease, No events on Telemetry; Currently HD stable  -Heme - h/o chronic anemia since childhood, previously on Iron supplementation; No evidence of active bleeding; c/w IV iron sucrose 300mg x 3days; Heme consulted and follwoing  -Neuro - Severe chronic LE neuropathy - pt. requires assistance w/ ambulation; Neurology consulted  -DASH diet  -DVT PPx  -PT eval - rec acute rehab   -Dispo: Cardiac Telemetry    Smith Jessica MD  Cardiology Attending

## 2022-03-29 NOTE — OCCUPATIONAL THERAPY INITIAL EVALUATION ADULT - LIVES WITH, PROFILE
Pt lives alone in apt. Pt at baseline requires assist for ADLs however does not have anyone to assist (reports difficulties with shower transfers) and uses RW for functional mobility./alone

## 2022-03-29 NOTE — OCCUPATIONAL THERAPY INITIAL EVALUATION ADULT - GROSSLY INTACT, SENSORY
Pt reports chronic decrease sensation on L ulnar side;  possibly due to ulnar compression- baseline and MD aware/Grossly Intact

## 2022-03-29 NOTE — OCCUPATIONAL THERAPY INITIAL EVALUATION ADULT - MD ORDER
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  She is unsure how long she was unconscious for, thinks it may have been hours, but next she remembers is EMS moving her while she was on the floor in her bathroom.

## 2022-03-29 NOTE — OCCUPATIONAL THERAPY INITIAL EVALUATION ADULT - MODALITIES TREATMENT COMMENTS
Pt performed functional mobility with CGA with RW however while ambulating back to room ~30ft required Min Ax1 with RW 2/2 decrease strength and reporting L LE numbness (typically happens at home).

## 2022-03-29 NOTE — DIETITIAN INITIAL EVALUATION ADULT. - OTHER CALCULATIONS
%VMP=245; IBW used to calculate energy needs due to pt's current body weight exceeding 120% of IBW; adjusted for age and current conditions

## 2022-03-29 NOTE — DIETITIAN INITIAL EVALUATION ADULT. - PROBLEM SELECTOR PLAN 3
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

## 2022-03-29 NOTE — PROGRESS NOTE ADULT - PROBLEM SELECTOR PLAN 3
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    --Pt follows w/ outpatient neurologist.    --CONT: Gabapentin and Wellbutrin.

## 2022-03-29 NOTE — OCCUPATIONAL THERAPY INITIAL EVALUATION ADULT - DIAGNOSIS, OT EVAL
Pt demonstrates decrease in balance, strength  (L hip/ knee) and motor planning affecting ADLs and functional mobility.

## 2022-03-29 NOTE — DIETITIAN INITIAL EVALUATION ADULT. - OTHER INFO
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 CTA chest PE protocol negative for central PE. Trop negative x2, EKG sinus tach with non-specific repol abnormality. Neuro following to r/o MS; plan for MR Brain, Cervical, and Thoracic spine w/ and w/o contrast.     Pt seen alert in room.  No events on Telemetry; Currently HD stable; per IDR may be transferred to medicine. Reports fair PO intake during admission, ordered for DASH TLC diet at time. Seems to be on a regular diet PTA; reports during pandemic eating more and having less portion control which resulted in ~23 pound wt gain. Pt wanting to lose wt s/p d/c, pt with many diet related questions - addressed and answered today. NKFA. No pain. Jayant 20. +BM 3/28, ordered for senna and Miralax. No edema pressure ulcers.   Please see RD Recs Below.

## 2022-03-29 NOTE — PROGRESS NOTE ADULT - ASSESSMENT
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 Pt’s cardiac workup grossly normal at this time. Neuro recommending work up for MS (can be done outpatient). Tentatively planning for discharge tomorrow 3/30/22.

## 2022-03-29 NOTE — PROGRESS NOTE ADULT - PROBLEM SELECTOR PLAN 1
Pt reports long history of fainting/near-fainting episodes for several years, occurring nearly every week, described as a prodrome of dizziness, lightheadedness, palpitations, and shortness of breath that requires her to lay flat. Denies any prior cardiac work-up, denies seizure hx or drug/ETOH use  CTA chest PE protocol negative for central PE (Motion artifact limits evaluation of the bilateral segmental and subsegmental pulmonary arteries), dimer elevated   Head CT negative  Trop negative x2, EKG sinus tach with non-specific repol abnormality  UTOX negative  DDx: vasovagal vs orthostatic, possibly POTS, unlikely seizure, but possibly neurogenic (??MS-like symoptoms), less likely cardiogenic   - Tele: not significant for anything other than sinus tachy   - echo ordered  - f/u LE duplex  - f/u orthostatics
--Long Hx of fainting/near-syncope for several years.    --CT PE protocol negative.    --CT Head negative.    --Trop T (-) x2.    --TTE (3/28/22): LVEF 56%, mild sLVH, no significant valvular disease.    --No further inpatient work up negative; pt to follow up outpatient for further MS rule out; MRI brain/cervical spine/thoracic spine are ordered - may get done before discharge but not indication to delay discharge.

## 2022-03-29 NOTE — PROGRESS NOTE ADULT - PROBLEM SELECTOR PLAN 2
Hgb 9.6 on arrival, unknown baseline--> 8.8 mg today   She reports having "genetic" anemia, had to take iron supplements as a child and states she doesn't absorb iron well. Denies sickle cell or thalassemia  - Ferritin is very low, possibly explaining her left lower extremity numbness, restless like symptoms   -denies melena but reports occasional minimal blood in stool  - Started on 3 day course of Fe sucrose  - Heme onc consulted for further genetic testing for anemia causes
--Hgb stable 8-9s; unknown Hgb baseline.    --Being worked up as an outpatient. Receiving IV iron sucrose inpatient, will need iron supplementation on discharge.

## 2022-03-29 NOTE — OCCUPATIONAL THERAPY INITIAL EVALUATION ADULT - MARITAL STATUS
Single

## 2022-03-29 NOTE — PROGRESS NOTE ADULT - SUBJECTIVE AND OBJECTIVE BOX
**INCOMPLETE / IN PROGRESS NOTE**    Cardiology PA Adult Progress Note    Subjective Assessment:  	  MEDICATIONS:  buPROPion SR (12-Hour) 100 milliGRAM(s) Oral daily  gabapentin 300 milliGRAM(s) Oral daily  polyethylene glycol 3350 17 Gram(s) Oral daily  senna 2 Tablet(s) Oral at bedtime  levothyroxine 125 MICROGram(s) Oral every 24 hours  enoxaparin Injectable 40 milliGRAM(s) SubCutaneous every 24 hours  influenza   Vaccine 0.5 milliLiter(s) IntraMuscular once  iron sucrose IVPB 300 milliGRAM(s) IV Intermittent every 24 hours      PHYSICAL EXAM:  TELEMETRY:  T(C): 36.9 (03-29-22 @ 14:09), Max: 37.2 (03-28-22 @ 21:49)  HR: 98 (03-29-22 @ 14:34) (83 - 106)  BP: 148/89 (03-29-22 @ 14:34) (115/84 - 148/89)  RR: 16 (03-29-22 @ 14:34) (16 - 18)  SpO2: 100% (03-29-22 @ 14:34) (98% - 100%)  Wt(kg): --  I&O's Summary    28 Mar 2022 07:01  -  29 Mar 2022 07:00  --------------------------------------------------------  IN: 610 mL / OUT: 1125 mL / NET: -515 mL    29 Mar 2022 07:01  -  29 Mar 2022 14:58  --------------------------------------------------------  IN: 240 mL / OUT: 200 mL / NET: 40 mL      Constitutional: NAD  HEENT: PERRL, EOMI, sclera non-icteric, neck supple, trachea midline, no masses, no JVD, MMM, good dentition  Respiratory: CTA b/l, good air entry b/l, no wheezing, no rhonchi, no rales, without accessory muscle use and no intercostal retractions  Cardiovascular: RRR, normal S1S2, no M/R/G  Gastrointestinal: abdomen soft, NTND, no masses palpable, BS normal  Extremities: Warm, well perfused, pulses equal bilateral upper and lower extremities, no edema, no clubbing  Neurological: AAOx3, CN Grossly intact, patients left foot is 3/5 with plantar/dorsiflexion, knee extension 3/5 and thigh flexion 3/5. 5/5 on right side. diminished sensaion along left thigh, worst posteriorly. Left 4 and 5th fingers with weakness and numbness.   Skin: Normal temperature, warm, dry      LABS:	 	  CARDIAC MARKERS:                        8.9    6.63  )-----------( 192      ( 29 Mar 2022 06:40 )             29.0    139  |  107  |  9   ----------------------------<  105<H>  3.9   |  21<L>  |  0.88    Ca    9.1      29 Mar 2022 06:40  Phos  3.6     03-29  Mg     2.0     03-29  TPro  6.7  /  Alb  3.8  /  TBili  0.3  /  DBili  x   /  AST  21  /  ALT  15  /  AlkPhos  56  03-29 Cardiology PA Adult Progress Note    Subjective Assessment:  	  MEDICATIONS:  buPROPion SR (12-Hour) 100 milliGRAM(s) Oral daily  gabapentin 300 milliGRAM(s) Oral daily  polyethylene glycol 3350 17 Gram(s) Oral daily  senna 2 Tablet(s) Oral at bedtime  levothyroxine 125 MICROGram(s) Oral every 24 hours  enoxaparin Injectable 40 milliGRAM(s) SubCutaneous every 24 hours  influenza   Vaccine 0.5 milliLiter(s) IntraMuscular once  iron sucrose IVPB 300 milliGRAM(s) IV Intermittent every 24 hours      PHYSICAL EXAM:  TELEMETRY:  T(C): 36.9 (03-29-22 @ 14:09), Max: 37.2 (03-28-22 @ 21:49)  HR: 98 (03-29-22 @ 14:34) (83 - 106)  BP: 148/89 (03-29-22 @ 14:34) (115/84 - 148/89)  RR: 16 (03-29-22 @ 14:34) (16 - 18)  SpO2: 100% (03-29-22 @ 14:34) (98% - 100%)  Wt(kg): --  I&O's Summary    28 Mar 2022 07:01  -  29 Mar 2022 07:00  --------------------------------------------------------  IN: 610 mL / OUT: 1125 mL / NET: -515 mL    29 Mar 2022 07:01  -  29 Mar 2022 14:58  --------------------------------------------------------  IN: 240 mL / OUT: 200 mL / NET: 40 mL      Constitutional: NAD  HEENT: PERRL, EOMI, sclera non-icteric, neck supple, trachea midline, no masses, no JVD, MMM, good dentition  Respiratory: CTA b/l, good air entry b/l, no wheezing, no rhonchi, no rales, without accessory muscle use and no intercostal retractions  Cardiovascular: RRR, normal S1S2, no M/R/G  Gastrointestinal: abdomen soft, NTND, no masses palpable, BS normal  Extremities: Warm, well perfused, pulses equal bilateral upper and lower extremities, no edema, no clubbing  Neurological: AAOx3, CN Grossly intact, patients left foot is 3/5 with plantar/dorsiflexion, knee extension 3/5 and thigh flexion 3/5. 5/5 on right side. diminished sensaion along left thigh, worst posteriorly. Left 4 and 5th fingers with weakness and numbness.   Skin: Normal temperature, warm, dry      LABS:	 	  CARDIAC MARKERS:                        8.9    6.63  )-----------( 192      ( 29 Mar 2022 06:40 )             29.0    139  |  107  |  9   ----------------------------<  105<H>  3.9   |  21<L>  |  0.88    Ca    9.1      29 Mar 2022 06:40  Phos  3.6     03-29  Mg     2.0     03-29  TPro  6.7  /  Alb  3.8  /  TBili  0.3  /  DBili  x   /  AST  21  /  ALT  15  /  AlkPhos  56  03-29

## 2022-03-29 NOTE — DIETITIAN INITIAL EVALUATION ADULT. - PROBLEM SELECTOR PLAN 1
-pt reports long history of fainting/near-fainting episodes for several years, occurring nearly every week, described as a prodrome of dizziness, lightheadedness, palpitations, and shortness of breath that requires her to lay flat. Denies any prior cardiac work-up, denies seizure hx or drug/ETOH use  -CTA chest PE protocol negative for central PE (Motion artifact limits evaluation of the bilateral segmental and subsegmental pulmonary arteries)  -Head CT negative  -Trop negative x2, EKG (per ER provider note, as EKG not provided from [facility]) sinus tach with non-specific repol abnormality  -UTOX negative  -monitor tele  -echo ordered  -f/u LE duplex  -f/u orthostatics

## 2022-03-29 NOTE — PROGRESS NOTE ADULT - PROBLEM SELECTOR PLAN 5
F: None   E: Replete as necessary K>4 Mg>2  N: DASH diet   DVT Prophylaxis: Lovenox 40mg daily   GI prophylaxis: None   CODE STATUS: FULL
F: None    E: Replete as necessary K>4 Mg>2   N: DASH diet    DVT Prophylaxis: Lovenox 40mg daily    GI prophylaxis: None    CODE STATUS: FULL.

## 2022-03-30 ENCOUNTER — TRANSCRIPTION ENCOUNTER (OUTPATIENT)
Age: 47
End: 2022-03-30

## 2022-03-30 PROBLEM — J45.909 UNSPECIFIED ASTHMA, UNCOMPLICATED: Chronic | Status: ACTIVE | Noted: 2022-03-28

## 2022-03-30 PROBLEM — Z00.00 ENCOUNTER FOR PREVENTIVE HEALTH EXAMINATION: Status: ACTIVE | Noted: 2022-03-30

## 2022-03-30 PROBLEM — L93.0 DISCOID LUPUS ERYTHEMATOSUS: Chronic | Status: ACTIVE | Noted: 2022-03-28

## 2022-03-30 PROBLEM — E03.9 HYPOTHYROIDISM, UNSPECIFIED: Chronic | Status: ACTIVE | Noted: 2022-03-28

## 2022-03-30 PROBLEM — D64.9 ANEMIA, UNSPECIFIED: Chronic | Status: ACTIVE | Noted: 2022-03-28

## 2022-03-30 PROBLEM — G62.9 POLYNEUROPATHY, UNSPECIFIED: Chronic | Status: ACTIVE | Noted: 2022-03-28

## 2022-03-30 LAB
ANION GAP SERPL CALC-SCNC: 10 MMOL/L — SIGNIFICANT CHANGE UP (ref 5–17)
BUN SERPL-MCNC: 13 MG/DL — SIGNIFICANT CHANGE UP (ref 7–23)
CALCIUM SERPL-MCNC: 9.5 MG/DL — SIGNIFICANT CHANGE UP (ref 8.4–10.5)
CHLORIDE SERPL-SCNC: 102 MMOL/L — SIGNIFICANT CHANGE UP (ref 96–108)
CO2 SERPL-SCNC: 23 MMOL/L — SIGNIFICANT CHANGE UP (ref 22–31)
CREAT SERPL-MCNC: 0.94 MG/DL — SIGNIFICANT CHANGE UP (ref 0.5–1.3)
EGFR: 75 ML/MIN/1.73M2 — SIGNIFICANT CHANGE UP
GLUCOSE SERPL-MCNC: 110 MG/DL — HIGH (ref 70–99)
HCT VFR BLD CALC: 29.5 % — LOW (ref 34.5–45)
HGB BLD-MCNC: 9.3 G/DL — LOW (ref 11.5–15.5)
MAGNESIUM SERPL-MCNC: 2 MG/DL — SIGNIFICANT CHANGE UP (ref 1.6–2.6)
MCHC RBC-ENTMCNC: 28.6 PG — SIGNIFICANT CHANGE UP (ref 27–34)
MCHC RBC-ENTMCNC: 31.5 GM/DL — LOW (ref 32–36)
MCV RBC AUTO: 90.8 FL — SIGNIFICANT CHANGE UP (ref 80–100)
NRBC # BLD: 0 /100 WBCS — SIGNIFICANT CHANGE UP (ref 0–0)
PLATELET # BLD AUTO: 227 K/UL — SIGNIFICANT CHANGE UP (ref 150–400)
POTASSIUM SERPL-MCNC: 3.9 MMOL/L — SIGNIFICANT CHANGE UP (ref 3.5–5.3)
POTASSIUM SERPL-SCNC: 3.9 MMOL/L — SIGNIFICANT CHANGE UP (ref 3.5–5.3)
RBC # BLD: 3.25 M/UL — LOW (ref 3.8–5.2)
RBC # FLD: 15.8 % — HIGH (ref 10.3–14.5)
SODIUM SERPL-SCNC: 135 MMOL/L — SIGNIFICANT CHANGE UP (ref 135–145)
WBC # BLD: 6.51 K/UL — SIGNIFICANT CHANGE UP (ref 3.8–10.5)
WBC # FLD AUTO: 6.51 K/UL — SIGNIFICANT CHANGE UP (ref 3.8–10.5)

## 2022-03-30 RX ORDER — ACETAMINOPHEN 500 MG
2 TABLET ORAL
Qty: 0 | Refills: 0 | DISCHARGE
Start: 2022-03-30

## 2022-03-30 RX ORDER — ACETAMINOPHEN 500 MG
650 TABLET ORAL EVERY 6 HOURS
Refills: 0 | Status: DISCONTINUED | OUTPATIENT
Start: 2022-03-30 | End: 2022-03-31

## 2022-03-30 RX ORDER — METOPROLOL TARTRATE 50 MG
25 TABLET ORAL DAILY
Refills: 0 | Status: DISCONTINUED | OUTPATIENT
Start: 2022-03-30 | End: 2022-03-31

## 2022-03-30 RX ORDER — LANOLIN ALCOHOL/MO/W.PET/CERES
5 CREAM (GRAM) TOPICAL AT BEDTIME
Refills: 0 | Status: COMPLETED | OUTPATIENT
Start: 2022-03-30 | End: 2022-03-30

## 2022-03-30 RX ORDER — METOPROLOL TARTRATE 50 MG
1 TABLET ORAL
Qty: 30 | Refills: 2
Start: 2022-03-30 | End: 2022-06-27

## 2022-03-30 RX ADMIN — Medication 650 MILLIGRAM(S): at 18:23

## 2022-03-30 RX ADMIN — IRON SUCROSE 176.67 MILLIGRAM(S): 20 INJECTION, SOLUTION INTRAVENOUS at 13:11

## 2022-03-30 RX ADMIN — POLYETHYLENE GLYCOL 3350 17 GRAM(S): 17 POWDER, FOR SOLUTION ORAL at 12:05

## 2022-03-30 RX ADMIN — Medication 125 MICROGRAM(S): at 05:32

## 2022-03-30 RX ADMIN — Medication 650 MILLIGRAM(S): at 19:20

## 2022-03-30 RX ADMIN — Medication 5 MILLIGRAM(S): at 23:50

## 2022-03-30 RX ADMIN — SENNA PLUS 2 TABLET(S): 8.6 TABLET ORAL at 23:47

## 2022-03-30 RX ADMIN — ENOXAPARIN SODIUM 40 MILLIGRAM(S): 100 INJECTION SUBCUTANEOUS at 23:46

## 2022-03-30 RX ADMIN — Medication 25 MILLIGRAM(S): at 17:35

## 2022-03-30 RX ADMIN — GABAPENTIN 300 MILLIGRAM(S): 400 CAPSULE ORAL at 11:22

## 2022-03-30 RX ADMIN — BUPROPION HYDROCHLORIDE 100 MILLIGRAM(S): 150 TABLET, EXTENDED RELEASE ORAL at 11:22

## 2022-03-30 NOTE — DISCHARGE NOTE PROVIDER - NSDCCPGOAL_GEN_ALL_CORE_FT
To get better and follow your care plan as instructed.
Patient alert, oriented x3, pleasant and cooperative.  Pt denies S/H IIP currently.  Nurse and  reviewed discharge plan with patient who agrees and accepts plan.  Pt provided with a copy of discharge paperwork.  Pt appropriately dressed for discharge and is transported home by  taxi to ensure safe arrival home.

## 2022-03-30 NOTE — DISCHARGE NOTE NURSING/CASE MANAGEMENT/SOCIAL WORK - NSDCPEFALRISK_GEN_ALL_CORE
For information on Fall & Injury Prevention, visit: [email or web address] OR  [email or web address] OR  https://www.cdc.gov/steadi/patient.html

## 2022-03-30 NOTE — DISCHARGE NOTE NURSING/CASE MANAGEMENT/SOCIAL WORK - PATIENT PORTAL LINK FT
You can access the FollowMyHealth Patient Portal offered by [facility] by registering at the following website: [email or web address]. By joining [employer]’s FollowMyHealth portal, you will also be able to view your health information using other applications (apps) compatible with our system.

## 2022-03-30 NOTE — DISCHARGE NOTE PROVIDER - HOSPITAL COURSE
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 Pt’s cardiac workup grossly normal at this time. Neuro recommending work up for MS (can be done outpatient). While in hospital, patient evaluated by physical therapy and was recommended for subacute rehab; however, patient refused at this time. TTE (3/28/22): LVEF 56%, mild sLVH, no significant valvular disease. Pt has a neurologist (Dr. Dc; [facility]), who she will follow up with for further workup for MS.     Pt seen and evaluated at bedside. Labs unremarkable, VSS, no events on telemetry, physical exam benign (neuro status at baseline). Hospital course reviewed with attending cardiologist and patient deemed stable for discharge home. Pt has close follow up with her neurologist. Pt was recommended for SHIRLENE but refused at this time. Pt was set up for wheelchair.      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 Pt’s cardiac workup grossly normal at this time. Neuro recommending work up for MS (can be done outpatient). While in hospital, patient evaluated by physical therapy and was recommended for subacute rehab; however, patient refused at this time. TTE (3/28/22): LVEF 56%, mild sLVH, no significant valvular disease. Pt has a neurologist (Dr. Dc; [facility]), who she will follow up with for further workup for MS.     Pt seen and evaluated at bedside. Labs unremarkable, VSS, no events on telemetry, physical exam benign (neuro status at baseline). Hospital course reviewed with attending cardiologist and patient deemed stable for discharge home. Pt has close follow up with neurology (Dr. William) on Monday 4/18/22 @ 10am. Pt was recommended for SHIRLENE but refused at this time. Pt was set up for wheelchair.      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 Pt’s cardiac workup grossly normal at this time. Neuro recommending work up for MS (can be done outpatient). While in hospital, patient evaluated by physical therapy and was recommended for subacute rehab; however, patient refused at this time. TTE (3/28/22): LVEF 56%, mild sLVH, no significant valvular disease. Pt has a neurologist (Dr. Dc; [facility]), who she will follow up with for further workup for MS. Pt w/ high resting heart rate. Pt started on Toprol 25mg PO QD.     Pt seen and evaluated at bedside. Labs unremarkable, VSS, no events on telemetry, physical exam benign (neuro status at baseline). Hospital course reviewed with attending cardiologist and patient deemed stable for discharge home. Pt has close follow up with neurology (Dr. William) on Monday 4/18/22 @ 10am. Pt was recommended for SHIRLENE but refused at this time. Pt was set up for wheelchair.      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 Pt’s cardiac workup grossly normal at this time. Neuro recommending work up for MS (can be done outpatient). While in hospital, patient evaluated by physical therapy and was recommended for subacute rehab; however, patient refused at this time. TTE (3/28/22): LVEF 56%, mild sLVH, no significant valvular disease. Pt has a neurologist (Dr. Dc; [facility]), who she will follow up with for further workup for MS. Pt w/ high resting heart rate. Pt started on Toprol 25mg PO QD.     Pt seen and evaluated at bedside. Labs unremarkable, VSS, no events on telemetry, physical exam benign (neuro status at baseline). Hospital course reviewed with attending cardiologist and patient deemed stable for discharge home. Pt has close follow up with neurology (Dr. William) on Monday 4/18/22 @ 10am. Pt was recommended for SHIRLENE but refused at this time. Pt was set up for wheelchair.     Patient's discharge on 3/30 was cancelled due to patient not being able to locate apartment keys and transportation issues. SW notified at that time, issues resolved and now plan for discharge today on 3/31.

## 2022-03-30 NOTE — DISCHARGE NOTE NURSING/CASE MANAGEMENT/SOCIAL WORK - NSDCFUADDAPPT_GEN_ALL_CORE_FT
Please follow up with your neurologist, Dr. Dc, within 1 week of discharge. It is recommended that you complete an outpatient work up to rule out multiple sclerosis (MS).

## 2022-03-30 NOTE — DISCHARGE NOTE PROVIDER - NSDCFUADDAPPT_GEN_ALL_CORE_FT
Please follow up with your neurologist, Dr. Dc, within 1 week of discharge. It is recommended that you complete an outpatient work up to rule out multiple sclerosis (MS).  Please follow up with neurologist, Dr. William, on Monday, 4/18/22 at 10:00AM.

## 2022-03-30 NOTE — DISCHARGE NOTE PROVIDER - NSDCCPCAREPLAN_GEN_ALL_CORE_FT
PRINCIPAL DISCHARGE DIAGNOSIS  Diagnosis: Syncope  Assessment and Plan of Treatment: You were admitted to [facility] due to a syncopal event. Your cardiac workup while in the hospital was grossly normal, and we do not believe that your syncope is due to any cardiac issues. You echocardiogram showed your heart pump function was normal and you have no significant valvular issues in your heart. You had a CT head done that was negative. You also had a CT of your chest showing no blood clots in your lungs. While you were here, you were seen by our neurology team. They recommended you undergo a work up for multiple sclerosis (MS). You did not have your MRI testing done here, and should follow up with your neurologist, Dr. Dc, within 1 week in order to pursue outpatient work up.       PRINCIPAL DISCHARGE DIAGNOSIS  Diagnosis: Syncope  Assessment and Plan of Treatment: You were admitted to [facility] due to a syncopal event. Your cardiac workup while in the hospital was grossly normal, and we do not believe that your syncope is due to any cardiac issues. You echocardiogram showed your heart pump function was normal and you have no significant valvular issues in your heart. You had a CT head done that was negative. You also had a CT of your chest showing no blood clots in your lungs. While you were here, you were seen by our neurology team. They recommended you undergo a work up for multiple sclerosis (MS). You did not have your MRI testing done here, and should follow up with neurologist, Dr. William, on Monday, 4/18/22 at 10:00AM.

## 2022-03-30 NOTE — DISCHARGE NOTE PROVIDER - NSDCMRMEDTOKEN_GEN_ALL_CORE_FT
Advair Diskus 250 mcg-50 mcg inhalation powder: 1 puff(s) inhaled 2 times a day  Albuterol (Eqv-ProAir HFA) 90 mcg/inh inhalation aerosol: 2 puff(s) inhaled every 6 hours, As Needed  buPROPion 100 mg/12 hours (SR) oral tablet, extended release: 1 tab(s) orally once a day  gabapentin 300 mg oral capsule: 1 cap(s) orally once a day  iron sulfate: 85 milligram(s) orally once a day  levothyroxine 125 mcg (0.125 mg) oral capsule: 1 cap(s) orally once a day   Advair Diskus 250 mcg-50 mcg inhalation powder: 1 puff(s) inhaled 2 times a day  Albuterol (Eqv-ProAir HFA) 90 mcg/inh inhalation aerosol: 2 puff(s) inhaled every 6 hours, As Needed  buPROPion 100 mg/12 hours (SR) oral tablet, extended release: 1 tab(s) orally once a day  gabapentin 300 mg oral capsule: 1 cap(s) orally once a day  iron sulfate: 85 milligram(s) orally once a day  levothyroxine 125 mcg (0.125 mg) oral capsule: 1 cap(s) orally once a day  Toprol-XL 25 mg oral tablet, extended release: 1 tab(s) orally once a day    acetaminophen 325 mg oral tablet: 2 tab(s) orally every 6 hours, As needed, Mild Pain (1 - 3), Moderate Pain (4 - 6)  Advair Diskus 250 mcg-50 mcg inhalation powder: 1 puff(s) inhaled 2 times a day  Albuterol (Eqv-ProAir HFA) 90 mcg/inh inhalation aerosol: 2 puff(s) inhaled every 6 hours, As Needed  buPROPion 100 mg/12 hours (SR) oral tablet, extended release: 1 tab(s) orally once a day  gabapentin 300 mg oral capsule: 1 cap(s) orally once a day  iron sulfate: 85 milligram(s) orally once a day  levothyroxine 125 mcg (0.125 mg) oral capsule: 1 cap(s) orally once a day  Toprol-XL 25 mg oral tablet, extended release: 1 tab(s) orally once a day

## 2022-03-30 NOTE — DISCHARGE NOTE PROVIDER - CARE PROVIDER_API CALL
Brennan William)  Clinical Neurophysiology; Neurology  [address] [address]  Phone: [phone]  Fax: [phone]  Scheduled Appointment: 04/18/2022 10:00 AM

## 2022-03-31 VITALS — TEMPERATURE: 99 F

## 2022-03-31 PROCEDURE — 83735 ASSAY OF MAGNESIUM: CPT

## 2022-03-31 PROCEDURE — 99285 EMERGENCY DEPT VISIT HI MDM: CPT | Mod: 25

## 2022-03-31 PROCEDURE — 82607 VITAMIN B-12: CPT

## 2022-03-31 PROCEDURE — 84484 ASSAY OF TROPONIN QUANT: CPT

## 2022-03-31 PROCEDURE — 87635 SARS-COV-2 COVID-19 AMP PRB: CPT

## 2022-03-31 PROCEDURE — 84100 ASSAY OF PHOSPHORUS: CPT

## 2022-03-31 PROCEDURE — 80048 BASIC METABOLIC PNL TOTAL CA: CPT

## 2022-03-31 PROCEDURE — 83010 ASSAY OF HAPTOGLOBIN QUANT: CPT

## 2022-03-31 PROCEDURE — 83550 IRON BINDING TEST: CPT

## 2022-03-31 PROCEDURE — 70450 CT HEAD/BRAIN W/O DYE: CPT

## 2022-03-31 PROCEDURE — 86769 SARS-COV-2 COVID-19 ANTIBODY: CPT

## 2022-03-31 PROCEDURE — 85379 FIBRIN DEGRADATION QUANT: CPT

## 2022-03-31 PROCEDURE — 84439 ASSAY OF FREE THYROXINE: CPT

## 2022-03-31 PROCEDURE — 82962 GLUCOSE BLOOD TEST: CPT

## 2022-03-31 PROCEDURE — 97161 PT EVAL LOW COMPLEX 20 MIN: CPT

## 2022-03-31 PROCEDURE — 36415 COLL VENOUS BLD VENIPUNCTURE: CPT

## 2022-03-31 PROCEDURE — 80061 LIPID PANEL: CPT

## 2022-03-31 PROCEDURE — 82728 ASSAY OF FERRITIN: CPT

## 2022-03-31 PROCEDURE — 85025 COMPLETE CBC W/AUTO DIFF WBC: CPT

## 2022-03-31 PROCEDURE — 82746 ASSAY OF FOLIC ACID SERUM: CPT

## 2022-03-31 PROCEDURE — 80307 DRUG TEST PRSMV CHEM ANLYZR: CPT

## 2022-03-31 PROCEDURE — 84480 ASSAY TRIIODOTHYRONINE (T3): CPT

## 2022-03-31 PROCEDURE — 80053 COMPREHEN METABOLIC PANEL: CPT

## 2022-03-31 PROCEDURE — 83540 ASSAY OF IRON: CPT

## 2022-03-31 PROCEDURE — 83880 ASSAY OF NATRIURETIC PEPTIDE: CPT

## 2022-03-31 PROCEDURE — 85045 AUTOMATED RETICULOCYTE COUNT: CPT

## 2022-03-31 PROCEDURE — 93970 EXTREMITY STUDY: CPT

## 2022-03-31 PROCEDURE — 85027 COMPLETE CBC AUTOMATED: CPT

## 2022-03-31 PROCEDURE — 84443 ASSAY THYROID STIM HORMONE: CPT

## 2022-03-31 PROCEDURE — 84436 ASSAY OF TOTAL THYROXINE: CPT

## 2022-03-31 PROCEDURE — 71275 CT ANGIOGRAPHY CHEST: CPT

## 2022-03-31 PROCEDURE — 93005 ELECTROCARDIOGRAM TRACING: CPT

## 2022-03-31 PROCEDURE — 97116 GAIT TRAINING THERAPY: CPT

## 2022-03-31 PROCEDURE — 83615 LACTATE (LD) (LDH) ENZYME: CPT

## 2022-03-31 PROCEDURE — 84702 CHORIONIC GONADOTROPIN TEST: CPT

## 2022-03-31 PROCEDURE — 93306 TTE W/DOPPLER COMPLETE: CPT

## 2022-03-31 PROCEDURE — 71045 X-RAY EXAM CHEST 1 VIEW: CPT

## 2022-03-31 RX ADMIN — Medication 25 MILLIGRAM(S): at 06:07

## 2022-03-31 RX ADMIN — Medication 125 MICROGRAM(S): at 06:07

## 2022-03-31 RX ADMIN — BUPROPION HYDROCHLORIDE 100 MILLIGRAM(S): 150 TABLET, EXTENDED RELEASE ORAL at 12:12

## 2022-03-31 RX ADMIN — POLYETHYLENE GLYCOL 3350 17 GRAM(S): 17 POWDER, FOR SOLUTION ORAL at 12:12

## 2022-03-31 RX ADMIN — GABAPENTIN 300 MILLIGRAM(S): 400 CAPSULE ORAL at 12:12

## 2022-04-02 ENCOUNTER — EMERGENCY (EMERGENCY)
Facility: HOSPITAL | Age: 47
LOS: 1 days | Discharge: HOME | End: 2022-04-02
Admitting: EMERGENCY MEDICINE
Payer: MEDICARE

## 2022-04-02 VITALS
RESPIRATION RATE: 18 BRPM | OXYGEN SATURATION: 99 % | HEART RATE: 128 BPM | DIASTOLIC BLOOD PRESSURE: 86 MMHG | TEMPERATURE: 98 F | WEIGHT: 179.9 LBS | HEIGHT: 66 IN | SYSTOLIC BLOOD PRESSURE: 127 MMHG

## 2022-04-02 VITALS
TEMPERATURE: 99 F | SYSTOLIC BLOOD PRESSURE: 150 MMHG | HEART RATE: 98 BPM | RESPIRATION RATE: 18 BRPM | DIASTOLIC BLOOD PRESSURE: 93 MMHG | OXYGEN SATURATION: 99 %

## 2022-04-02 DIAGNOSIS — J44.9 CHRONIC OBSTRUCTIVE PULMONARY DISEASE, UNSPECIFIED: ICD-10-CM

## 2022-04-02 DIAGNOSIS — R06.02 SHORTNESS OF BREATH: ICD-10-CM

## 2022-04-02 DIAGNOSIS — E89.0 POSTPROCEDURAL HYPOTHYROIDISM: Chronic | ICD-10-CM

## 2022-04-02 DIAGNOSIS — Z88.6 ALLERGY STATUS TO ANALGESIC AGENT: ICD-10-CM

## 2022-04-02 DIAGNOSIS — R00.0 TACHYCARDIA, UNSPECIFIED: ICD-10-CM

## 2022-04-02 DIAGNOSIS — F41.9 ANXIETY DISORDER, UNSPECIFIED: ICD-10-CM

## 2022-04-02 DIAGNOSIS — I80.9 PHLEBITIS AND THROMBOPHLEBITIS OF UNSPECIFIED SITE: ICD-10-CM

## 2022-04-02 DIAGNOSIS — E03.9 HYPOTHYROIDISM, UNSPECIFIED: ICD-10-CM

## 2022-04-02 DIAGNOSIS — I10 ESSENTIAL (PRIMARY) HYPERTENSION: ICD-10-CM

## 2022-04-02 DIAGNOSIS — F32.A DEPRESSION, UNSPECIFIED: ICD-10-CM

## 2022-04-02 DIAGNOSIS — G62.9 POLYNEUROPATHY, UNSPECIFIED: ICD-10-CM

## 2022-04-02 PROCEDURE — 93010 ELECTROCARDIOGRAM REPORT: CPT

## 2022-04-02 PROCEDURE — 99284 EMERGENCY DEPT VISIT MOD MDM: CPT

## 2022-04-02 RX ORDER — CEPHALEXIN 500 MG
1 CAPSULE ORAL
Qty: 28 | Refills: 0
Start: 2022-04-02 | End: 2022-04-08

## 2022-04-02 RX ORDER — CEPHALEXIN 500 MG
500 CAPSULE ORAL ONCE
Refills: 0 | Status: COMPLETED | OUTPATIENT
Start: 2022-04-02 | End: 2022-04-02

## 2022-04-02 RX ADMIN — Medication 500 MILLIGRAM(S): at 17:15

## 2022-04-02 NOTE — ED PROVIDER NOTE - MUSCULOSKELETAL, MLM
range of motion is not limited, no muscle or joint tenderness, baseline weakness and contractures of left upper and lower extremities, 2+ radial pulses equal bilat

## 2022-04-02 NOTE — ED PROVIDER NOTE - PATIENT PORTAL LINK FT
You can access the FollowMyHealth Patient Portal offered by [facility] by registering at the following website: [email or web address]. By joining [employer]’s FollowMyHealth portal, you will also be able to view your health information using other applications (apps) compatible with our system.

## 2022-04-02 NOTE — ED PROVIDER NOTE - NS ED MD DISPO DISCHARGE CCDA
Has The Growth Been Previously Biopsied?: has been previously biopsied Patient/Caregiver provided printed discharge information. Body Location Override (Optional): Right mid temple

## 2022-04-02 NOTE — ED ADULT TRIAGE NOTE - CHIEF COMPLAINT QUOTE
presents to ED c/o shortness of breath with RUE swelling/pain, attributes pain to IV placed when she was [facility]. +redness/swelling.

## 2022-04-02 NOTE — ED PROVIDER NOTE - NSFOLLOWUPINSTRUCTIONS_ED_ALL_ED_FT
Superficial Thrombophlebitis    WHAT YOU NEED TO KNOW:    What is superficial thrombophlebitis (STP)? STP is inflammation of a vein just under your skin (superficial vein). The inflammation causes a blood clot to form in your vein. STP most often happens in your leg but may also happen in your arm or neck.  Thrombus and Embolus         What increases my risk for STP?   •A condition that affects your blood vessels, such as varicose veins      •A long-term IV catheter      •Recent surgery      •Injections of prescription or non-prescription drugs into veins      •Obesity, pregnancy, or cancer      •Limited activity caused by bed rest, a leg cast, or sitting for long periods      •A blood disorder that makes your blood clot faster than normal, such as factor V Leiden mutation      •In women, hormone replacement therapy or birth control pills      What are the signs and symptoms of STP?   •A red line on the skin over the vein      •Pain near the vein, and sometimes swelling      •A fever if infection has spread from your vein to others places in your body      •Warm, red skin that is tender to the touch      •A hard area that feels like a knot      How is STP diagnosed? Your healthcare provider will examine you. You may need any of the following:  •Blood tests may be done to check for infection and test how fast your blood clots.      •Doppler ultrasound uses sound waves to check for blood clots or damage to your vein.      How is STP treated? Treatment may not be needed. STP usually goes away on its own. You may need any of the following for STP that continues:  •Medicines may be given to treat an infection and decrease swelling and pain. Medicine may also be given to prevent more blood clots.      •Removal of an IV catheter may be needed if your IV is infected.      •Surgery may be needed to remove the blood clot or part of your vein. Surgery may also be needed to remove a collection of infected fluid from your vein.      What can I do to manage STP?   •Apply a warm compress to your arm or leg. This will help decrease swelling and pain. Wet a washcloth in warm water. Do not use hot water. Apply the warm compress for 10 minutes. Repeat this 4 times each day.      •Wear pressure stockings as directed. Pressure stockings improve blood flow and help prevent clots in your legs. Wear the stockings during the day. Do not wear them when you sleep.  Pressure Stockings            •Elevate your leg or arm above the level of your heart as often as you can. This will help decrease swelling and pain. Prop your leg or arm on pillows or blankets to keep it elevated comfortably.  Elevate Leg                  What can I do to prevent STP?   •Maintain a healthy weight. This will help decrease your risk for another blood clot. Ask your healthcare provider what a healthy weight is for you. Ask him or her to help you create a weight loss plan if needed.      •Do not smoke. Nicotine and other chemicals in cigarettes and cigars can damage blood vessels and increase your risk for blood clots. Ask your healthcare provider for information if you currently smoke and need help to quit. E-cigarettes or smokeless tobacco still contain nicotine. Talk to your healthcare provider before you use these products.      •Change your body position or move around often. Move and stretch in your seat several times each hour if you travel by car or work at a desk. In an airplane, get up and walk every hour. Move your legs by tightening and releasing your leg muscles while sitting. You can move your legs while sitting by raising and lowering your heels. Keep your toes on the floor while you do this. You can also raise and lower your toes while keeping your heels on the floor.  DVT Prevention Heel Raise       DVT Prevention Toe Raise           •Exercise regularly to help increase your blood flow. Walking is a good low-impact exercise. Talk to your healthcare provider about the best exercise plan for you.   Family Walking for Exercise           •Do not inject non-prescription drugs. Talk to your healthcare provider if you need help to quit.      Call your local emergency number (911 in the [place]) if:   •You feel lightheaded, short of breath, and have chest pain.      •You cough up blood.      When should I seek immediate care?   •Your arm or leg feels warm, tender, and painful. It may look swollen and red.          When should I call my doctor?   •You have questions or concerns about your condition or care.        CARE AGREEMENT:    You have the right to help plan your care. Learn about your health condition and how it may be treated. Discuss treatment options with your healthcare providers to decide what care you want to receive. You always have the right to refuse treatment.

## 2022-04-02 NOTE — ED PROVIDER NOTE - OBJECTIVE STATEMENT
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 Pt’s cardiac workup grossly normal at that time. Neuro recommending work up for MS (can be done outpatient). While in hospital, patient evaluated by physical therapy and was recommended for subacute rehab; however, patient refused. TTE (3/28/22): LVEF 56%, mild sLVH, no significant valvular disease. Pt has a neurologist (Dr. Dc; [facility]), who she will follow up with for further workup for MS. Pt w/ high resting heart rate. Pt started on Toprol 25mg PO QD.     pt presents today with c/o right upper arm discomfort. pt had an IV in her right arm which infiltrated while she was admitted and she has been having local swelling and pain which is worse with movement. she denies swelling of the entire arm, numbness, tingling, weakness, fever, chills, cp, or sob. triage note describes sob but pt notes this is typical of her sinus tachycardia for which she was prescribed the toprol but she hasn't taken it today as her pharmacy had not delivered it yet and she is not currently concerned about this as she was just evaluated for this  on her inpatient stay.

## 2022-04-02 NOTE — ED PROVIDER NOTE - SKIN, MLM
Skin normal color for race, warm, dry. right AC with palpable cord with tenderness and minimal erythema to right AC, 2+ radial pulse.

## 2022-04-02 NOTE — ED ADULT NURSE REASSESSMENT NOTE - NS ED NURSE REASSESS COMMENT FT1
Confirmed with pt no allergix rx to naproxen. Pt reports taking it in the past
Pt received from previous shift RN. Pt already discharged. Pending transportation. Will continue to monitor.

## 2022-04-02 NOTE — ED PROVIDER NOTE - CLINICAL SUMMARY MEDICAL DECISION MAKING FREE TEXT BOX
pt with complex history and recent hospitalization presents with c/o right AC pain and swelling after known IV infiltration while admitted. palpable cord with mild overlying erythema. will cover with abx and nsaids. advised cold compresses and close follow up. pt has VNS coming to her home.

## 2022-04-02 NOTE — ED ADULT NURSE NOTE - OBJECTIVE STATEMENT
PT came in c/o of rue pain after getting "IV that infiltrated at [facility]" Swelling noted. Pt intially reported sob but denies sob at this time. A&Ox3 speaking in full sentences. Denies fever, chills, sob, cp, n/v/d.

## 2022-04-04 DIAGNOSIS — D50.9 IRON DEFICIENCY ANEMIA, UNSPECIFIED: ICD-10-CM

## 2022-04-04 DIAGNOSIS — R55 SYNCOPE AND COLLAPSE: ICD-10-CM

## 2022-04-04 DIAGNOSIS — Z91.81 HISTORY OF FALLING: ICD-10-CM

## 2022-04-04 DIAGNOSIS — F41.9 ANXIETY DISORDER, UNSPECIFIED: ICD-10-CM

## 2022-04-04 DIAGNOSIS — G56.22 LESION OF ULNAR NERVE, LEFT UPPER LIMB: ICD-10-CM

## 2022-04-04 DIAGNOSIS — F32.A DEPRESSION, UNSPECIFIED: ICD-10-CM

## 2022-04-04 DIAGNOSIS — K59.00 CONSTIPATION, UNSPECIFIED: ICD-10-CM

## 2022-04-04 DIAGNOSIS — E87.6 HYPOKALEMIA: ICD-10-CM

## 2022-04-04 DIAGNOSIS — E89.0 POSTPROCEDURAL HYPOTHYROIDISM: ICD-10-CM

## 2022-04-04 DIAGNOSIS — R29.6 REPEATED FALLS: ICD-10-CM

## 2022-04-04 DIAGNOSIS — D63.8 ANEMIA IN OTHER CHRONIC DISEASES CLASSIFIED ELSEWHERE: ICD-10-CM

## 2022-04-04 DIAGNOSIS — M32.9 SYSTEMIC LUPUS ERYTHEMATOSUS, UNSPECIFIED: ICD-10-CM

## 2022-04-04 DIAGNOSIS — J45.909 UNSPECIFIED ASTHMA, UNCOMPLICATED: ICD-10-CM

## 2022-04-04 DIAGNOSIS — Z82.49 FAMILY HISTORY OF ISCHEMIC HEART DISEASE AND OTHER DISEASES OF THE CIRCULATORY SYSTEM: ICD-10-CM

## 2022-04-04 DIAGNOSIS — R76.0 RAISED ANTIBODY TITER: ICD-10-CM

## 2022-04-04 DIAGNOSIS — G62.9 POLYNEUROPATHY, UNSPECIFIED: ICD-10-CM

## 2022-04-18 ENCOUNTER — APPOINTMENT (OUTPATIENT)
Dept: NEUROLOGY | Facility: AMBULATORY SURGERY CENTER | Age: 47
End: 2022-04-18

## 2022-04-20 ENCOUNTER — TRANSCRIPTION ENCOUNTER (OUTPATIENT)
Age: 47
End: 2022-04-20

## 2022-06-04 ENCOUNTER — EMERGENCY (EMERGENCY)
Facility: HOSPITAL | Age: 47
LOS: 1 days | Discharge: HOME | End: 2022-06-04
Admitting: EMERGENCY MEDICINE
Payer: MEDICARE

## 2022-06-04 VITALS
SYSTOLIC BLOOD PRESSURE: 131 MMHG | HEART RATE: 97 BPM | RESPIRATION RATE: 18 BRPM | OXYGEN SATURATION: 98 % | DIASTOLIC BLOOD PRESSURE: 89 MMHG

## 2022-06-04 VITALS
TEMPERATURE: 99 F | HEART RATE: 131 BPM | SYSTOLIC BLOOD PRESSURE: 172 MMHG | HEIGHT: 66 IN | RESPIRATION RATE: 18 BRPM | OXYGEN SATURATION: 99 % | DIASTOLIC BLOOD PRESSURE: 118 MMHG

## 2022-06-04 DIAGNOSIS — Z88.6 ALLERGY STATUS TO ANALGESIC AGENT: ICD-10-CM

## 2022-06-04 DIAGNOSIS — S05.02XA INJURY OF CONJUNCTIVA AND CORNEAL ABRASION WITHOUT FOREIGN BODY, LEFT EYE, INITIAL ENCOUNTER: ICD-10-CM
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
## 2022-06-04 DIAGNOSIS — E89.0 POSTPROCEDURAL HYPOTHYROIDISM: Chronic | ICD-10-CM

## 2022-06-04 DIAGNOSIS — H57.12 OCULAR PAIN, LEFT EYE: ICD-10-CM
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
## 2022-06-04 PROCEDURE — 99284 EMERGENCY DEPT VISIT MOD MDM: CPT

## 2022-06-04 RX ORDER — MORPHINE SULFATE 50 MG/1
1 CAPSULE, EXTENDED RELEASE ORAL
Qty: 6 | Refills: 0
Start: 2022-06-04 | End: 2022-06-05

## 2022-06-04 RX ORDER — MORPHINE SULFATE 50 MG/1
6 CAPSULE, EXTENDED RELEASE ORAL ONCE
Refills: 0 | Status: DISCONTINUED | OUTPATIENT
Start: 2022-06-04 | End: 2022-06-04

## 2022-06-04 RX ADMIN — MORPHINE SULFATE 6 MILLIGRAM(S): 50 CAPSULE, EXTENDED RELEASE ORAL at 07:00

## 2022-06-04 NOTE — ED PROVIDER NOTE - NS ED ROS FT
Review of Systems    Constitutional: (-) fever  Eyes/ENT: (-) sore throat, (-) ear pain  Musculoskeletal: (-) neck pain, (-) back pain, (-) joint pain  Integumentary: (-) rash, (-) edema  Neurological: (-) headache, (-) altered mental status

## 2022-06-04 NOTE — ED PROVIDER NOTE - PHYSICAL EXAMINATION
Physical Exam    Vital Signs: I have reviewed the initial vital signs.  Constitutional: well-nourished, appears stated age, no acute distress  Eyes: PERRLA, EOM intact, RAPD absent, no conjunctival injection, and symmetrical lids. OS 20/200, OD 20/20, OU 20/20, +L corneal abrasion in vision, Eye pressure 12 mmHg b/l  ENT: Neck supple with no adenopathy, moist MM.  Musculoskeletal: supple neck, no lower extremity edema  Integumentary: warm, dry, no rash  Neurologic: awake, alert, no gross cranial deficits

## 2022-06-04 NOTE — ED PROVIDER NOTE - PATIENT PORTAL LINK FT
You can access the FollowMyHealth Patient Portal offered by [facility] by registering at the following website: [email or web address]. By joining [employer]’s FollowMyHealth portal, you will also be able to view your health information using other applications (apps) compatible with our system.

## 2022-06-04 NOTE — ED ADULT TRIAGE NOTE - INTERNATIONAL TRAVEL
Onset: 2 days     Symptoms: Burning with urination, urinary frequency, urinary urgency,     Denies: Cold/pale/clammy skin,weakness, fever, chills, dizziness, lightheadedness, blood in urine, urinary incontinence, urinary retention, blood in urine, back/flank/abd pain, genital injury, pregnancy     Tried: cranberry pills.     Per protocol pt should be seen within 24 hours.  Pt given care advice and reasons to call back. Pt declined appointment.  States at the end the call she does not want to come in for a UA and will just do an online visit.  No further questions.  Routing to provider on call and pcp for fyi.       Reason for Disposition  • All other patients with painful urination(Exception: [1] EITHER frequency or urgency AND [2] has on-call doctor)    Protocols used: URINATION PAIN - FEMALE-A-      
Patient Name: Kristen Hearn  Specialist or PCP: Layne  Symptoms: UTI  Best Availability: anytime  Callback Number: [phone]    Thank you,  Estefania Schwab  
No

## 2022-06-04 NOTE — ED ADULT TRIAGE NOTE - CHIEF COMPLAINT QUOTE
Pt c/o left eye pain, reports "extensive medical history" w/ worsening vision x3 years. States she took naproxen and Gabapentin w/out relief. Denies injuries or acute visual changes. Pt denies HA, dizziness or weakness.

## 2022-06-04 NOTE — ED PROVIDER NOTE - CLINICAL SUMMARY MEDICAL DECISION MAKING FREE TEXT BOX
pt pw L eye pain aching mod in severity non radiating ongoing for 6 h in duration with +L eye corneal abrasion in axis of vision with decreased vision in affected eye.

## 2022-06-04 NOTE — ED PROVIDER NOTE - OBJECTIVE STATEMENT
45 yo f pmhx sig for SLE pw L eye pain aching mod in severity after pt fell asleep with contact lens woke up to take out the contact lens with pain in the L eye; with vision changes reports blurry vision in L eye. No n/v, headache.    I have reviewed available current nursing and previous documentation of past medical, surgical, family, and/or social history.

## 2022-06-04 NOTE — ED PROVIDER NOTE - NSFOLLOWUPCLINICS_GEN_ALL_ED_FT
[facility]  Family Medicine  [address]  [place], [place] [address]  Phone: [phone]  Fax:

## 2022-06-30 ENCOUNTER — APPOINTMENT (OUTPATIENT)
Dept: NEUROLOGY | Facility: AMBULATORY SURGERY CENTER | Age: 47
End: 2022-06-30

## 2022-12-17 ENCOUNTER — EMERGENCY (EMERGENCY)
Facility: HOSPITAL | Age: 47
LOS: 1 days | Discharge: HOME | End: 2022-12-17
Attending: EMERGENCY MEDICINE | Admitting: EMERGENCY MEDICINE
Payer: MEDICARE

## 2022-12-17 VITALS
WEIGHT: 169.98 LBS | DIASTOLIC BLOOD PRESSURE: 78 MMHG | RESPIRATION RATE: 16 BRPM | SYSTOLIC BLOOD PRESSURE: 108 MMHG | HEIGHT: 66 IN | OXYGEN SATURATION: 97 % | TEMPERATURE: 98 F | HEART RATE: 96 BPM

## 2022-12-17 DIAGNOSIS — E89.0 POSTPROCEDURAL HYPOTHYROIDISM: Chronic | ICD-10-CM

## 2022-12-17 LAB
ALBUMIN SERPL ELPH-MCNC: 3.8 G/DL — SIGNIFICANT CHANGE UP (ref 3.4–5)
ALP SERPL-CCNC: 56 U/L — SIGNIFICANT CHANGE UP (ref 40–120)
ALT FLD-CCNC: 24 U/L — SIGNIFICANT CHANGE UP (ref 12–42)
AMPHET UR-MCNC: NEGATIVE — SIGNIFICANT CHANGE UP
ANION GAP SERPL CALC-SCNC: 7 MMOL/L — LOW (ref 9–16)
APPEARANCE UR: ABNORMAL
AST SERPL-CCNC: 24 U/L — SIGNIFICANT CHANGE UP (ref 15–37)
BACTERIA # UR AUTO: ABNORMAL /HPF
BARBITURATES UR SCN-MCNC: NEGATIVE — SIGNIFICANT CHANGE UP
BASOPHILS # BLD AUTO: 0.01 K/UL — SIGNIFICANT CHANGE UP (ref 0–0.2)
BASOPHILS NFR BLD AUTO: 0.2 % — SIGNIFICANT CHANGE UP (ref 0–2)
BENZODIAZ UR-MCNC: NEGATIVE — SIGNIFICANT CHANGE UP
BILIRUB SERPL-MCNC: 0.3 MG/DL — SIGNIFICANT CHANGE UP (ref 0.2–1.2)
BILIRUB UR-MCNC: NEGATIVE — SIGNIFICANT CHANGE UP
BUN SERPL-MCNC: 15 MG/DL — SIGNIFICANT CHANGE UP (ref 7–23)
CALCIUM SERPL-MCNC: 8.7 MG/DL — SIGNIFICANT CHANGE UP (ref 8.5–10.5)
CHLORIDE SERPL-SCNC: 105 MMOL/L — SIGNIFICANT CHANGE UP (ref 96–108)
CO2 SERPL-SCNC: 26 MMOL/L — SIGNIFICANT CHANGE UP (ref 22–31)
COCAINE METAB.OTHER UR-MCNC: NEGATIVE — SIGNIFICANT CHANGE UP
COLOR SPEC: ABNORMAL
CREAT SERPL-MCNC: 1.26 MG/DL — SIGNIFICANT CHANGE UP (ref 0.5–1.3)
DIFF PNL FLD: ABNORMAL
EGFR: 52 ML/MIN/1.73M2 — LOW
EOSINOPHIL # BLD AUTO: 0 K/UL — SIGNIFICANT CHANGE UP (ref 0–0.5)
EOSINOPHIL NFR BLD AUTO: 0 % — SIGNIFICANT CHANGE UP (ref 0–6)
EPI CELLS # UR: ABNORMAL /HPF (ref 0–5)
ETHANOL SERPL-MCNC: <3 MG/DL — SIGNIFICANT CHANGE UP
FLUAV AG NPH QL: SIGNIFICANT CHANGE UP
FLUBV AG NPH QL: SIGNIFICANT CHANGE UP
GLUCOSE SERPL-MCNC: 113 MG/DL — HIGH (ref 70–99)
GLUCOSE UR QL: NEGATIVE — SIGNIFICANT CHANGE UP
HCG SERPL-ACNC: 1 MIU/ML — SIGNIFICANT CHANGE UP
HCT VFR BLD CALC: 30.2 % — LOW (ref 34.5–45)
HGB BLD-MCNC: 9.2 G/DL — LOW (ref 11.5–15.5)
HYALINE CASTS # UR AUTO: ABNORMAL /LPF (ref 0–2)
IMM GRANULOCYTES NFR BLD AUTO: 0.6 % — SIGNIFICANT CHANGE UP (ref 0–0.9)
KETONES UR-MCNC: 15 MG/DL
LEUKOCYTE ESTERASE UR-ACNC: NEGATIVE — SIGNIFICANT CHANGE UP
LYMPHOCYTES # BLD AUTO: 0.58 K/UL — LOW (ref 1–3.3)
LYMPHOCYTES # BLD AUTO: 10.9 % — LOW (ref 13–44)
MAGNESIUM SERPL-MCNC: 2.2 MG/DL — SIGNIFICANT CHANGE UP (ref 1.6–2.6)
MCHC RBC-ENTMCNC: 28.3 PG — SIGNIFICANT CHANGE UP (ref 27–34)
MCHC RBC-ENTMCNC: 30.5 GM/DL — LOW (ref 32–36)
MCV RBC AUTO: 92.9 FL — SIGNIFICANT CHANGE UP (ref 80–100)
METHADONE UR-MCNC: NEGATIVE — SIGNIFICANT CHANGE UP
MONOCYTES # BLD AUTO: 0.62 K/UL — SIGNIFICANT CHANGE UP (ref 0–0.9)
MONOCYTES NFR BLD AUTO: 11.6 % — SIGNIFICANT CHANGE UP (ref 2–14)
NEUTROPHILS # BLD AUTO: 4.1 K/UL — SIGNIFICANT CHANGE UP (ref 1.8–7.4)
NEUTROPHILS NFR BLD AUTO: 76.7 % — SIGNIFICANT CHANGE UP (ref 43–77)
NITRITE UR-MCNC: POSITIVE
NRBC # BLD: 0 /100 WBCS — SIGNIFICANT CHANGE UP (ref 0–0)
OPIATES UR-MCNC: NEGATIVE — SIGNIFICANT CHANGE UP
PCP SPEC-MCNC: SIGNIFICANT CHANGE UP
PCP UR-MCNC: NEGATIVE — SIGNIFICANT CHANGE UP
PH UR: 6 — SIGNIFICANT CHANGE UP (ref 5–8)
PLATELET # BLD AUTO: 165 K/UL — SIGNIFICANT CHANGE UP (ref 150–400)
POTASSIUM SERPL-MCNC: 3.9 MMOL/L — SIGNIFICANT CHANGE UP (ref 3.5–5.3)
POTASSIUM SERPL-SCNC: 3.9 MMOL/L — SIGNIFICANT CHANGE UP (ref 3.5–5.3)
PROT SERPL-MCNC: 8 G/DL — SIGNIFICANT CHANGE UP (ref 6.4–8.2)
PROT UR-MCNC: 30 MG/DL
RBC # BLD: 3.25 M/UL — LOW (ref 3.8–5.2)
RBC # FLD: 15.1 % — HIGH (ref 10.3–14.5)
RBC CASTS # UR COMP ASSIST: < 5 /HPF — SIGNIFICANT CHANGE UP
RSV RNA NPH QL NAA+NON-PROBE: SIGNIFICANT CHANGE UP
SARS-COV-2 RNA SPEC QL NAA+PROBE: DETECTED
SODIUM SERPL-SCNC: 138 MMOL/L — SIGNIFICANT CHANGE UP (ref 132–145)
SP GR SPEC: >=1.03 — SIGNIFICANT CHANGE UP (ref 1–1.03)
THC UR QL: NEGATIVE — SIGNIFICANT CHANGE UP
UROBILINOGEN FLD QL: 1 E.U./DL — SIGNIFICANT CHANGE UP
WBC # BLD: 5.34 K/UL — SIGNIFICANT CHANGE UP (ref 3.8–10.5)
WBC # FLD AUTO: 5.34 K/UL — SIGNIFICANT CHANGE UP (ref 3.8–10.5)
WBC UR QL: > 10 /HPF

## 2022-12-17 PROCEDURE — 93010 ELECTROCARDIOGRAM REPORT: CPT

## 2022-12-17 PROCEDURE — 99284 EMERGENCY DEPT VISIT MOD MDM: CPT | Mod: CS,25

## 2022-12-17 PROCEDURE — 71045 X-RAY EXAM CHEST 1 VIEW: CPT | Mod: 26

## 2022-12-17 PROCEDURE — 71045 X-RAY EXAM CHEST 1 VIEW: CPT | Mod: 26,77

## 2022-12-17 RX ORDER — CEPHALEXIN 500 MG
1 CAPSULE ORAL
Qty: 14 | Refills: 0
Start: 2022-12-17 | End: 2022-12-23

## 2022-12-17 RX ORDER — RITONAVIR 100 MG/1
100 TABLET, FILM COATED ORAL ONCE
Refills: 0 | Status: DISCONTINUED | OUTPATIENT
Start: 2022-12-17 | End: 2022-12-17

## 2022-12-17 RX ORDER — NIRMATRELVIR AND RITONAVIR 150-100 MG
1 KIT ORAL
Qty: 10 | Refills: 0
Start: 2022-12-17 | End: 2022-12-21

## 2022-12-17 RX ORDER — ACETAMINOPHEN 500 MG
975 TABLET ORAL ONCE
Refills: 0 | Status: COMPLETED | OUTPATIENT
Start: 2022-12-17 | End: 2022-12-17

## 2022-12-17 RX ORDER — SODIUM CHLORIDE 9 MG/ML
1000 INJECTION INTRAMUSCULAR; INTRAVENOUS; SUBCUTANEOUS ONCE
Refills: 0 | Status: COMPLETED | OUTPATIENT
Start: 2022-12-17 | End: 2022-12-17

## 2022-12-17 RX ORDER — CEPHALEXIN 500 MG
500 CAPSULE ORAL ONCE
Refills: 0 | Status: COMPLETED | OUTPATIENT
Start: 2022-12-17 | End: 2022-12-17

## 2022-12-17 RX ORDER — ONDANSETRON 8 MG/1
4 TABLET, FILM COATED ORAL ONCE
Refills: 0 | Status: COMPLETED | OUTPATIENT
Start: 2022-12-17 | End: 2022-12-17

## 2022-12-17 RX ADMIN — Medication 975 MILLIGRAM(S): at 23:41

## 2022-12-17 RX ADMIN — ONDANSETRON 4 MILLIGRAM(S): 8 TABLET, FILM COATED ORAL at 21:17

## 2022-12-17 RX ADMIN — SODIUM CHLORIDE 1000 MILLILITER(S): 9 INJECTION INTRAMUSCULAR; INTRAVENOUS; SUBCUTANEOUS at 23:25

## 2022-12-17 RX ADMIN — SODIUM CHLORIDE 1000 MILLILITER(S): 9 INJECTION INTRAMUSCULAR; INTRAVENOUS; SUBCUTANEOUS at 19:55

## 2022-12-17 NOTE — ED PROVIDER NOTE - PATIENT PORTAL LINK FT
You can access the FollowMyHealth Patient Portal offered by [facility] by registering at the following website: [email or web address]. By joining [employer]’s FollowMyHealth portal, you will also be able to view your health information using other applications (apps) compatible with our system.

## 2022-12-17 NOTE — ED PROVIDER NOTE - CLINICAL SUMMARY MEDICAL DECISION MAKING FREE TEXT BOX
Patient with past medical history as noted in the HPI who presents with 2 days of fever, chills, body aches, sore throat and home test positive for COVID.  Patient is not vaccinated for COVID.  Patient also has mild urinary frequency.  At this time patient's vital signs are otherwise stable normotensive and well-appearing and a normal exam.  Plan is to do full labs, electrolytes, urine with urine culture, IV fluids, Zofran as patient is noting some mild nausea while I was examining her.  Send flu RSV and COVID, and if positive start on Paxil Matthew medication list reviewed and there is no contraindications.  Patient otherwise has primary care and appears well and nontoxic has no signs of toxicity.  Plan will be to ultimately discharge if labs are normal check a chest x-ray as well for pneumonia

## 2022-12-17 NOTE — ED PROVIDER NOTE - PROGRESS NOTE DETAILS
All labs were discussed with patient, urine does have nitrates and she is complaining of urinary frequency in the setting of having a history of MS and lupus the plan is to treat the UTI and will send off a culture for sensitivity, start on Paxlovid, patient was discussed strict return precautions should any symptoms worsen should fever not be controlled at home should she not be able to tolerate p.o. to return to the emergency department as soon as possible.  She agrees with this plan and has no questions and feels that she is ready to go home and she is feeling much better

## 2022-12-17 NOTE — ED PROVIDER NOTE - NSFOLLOWUPINSTRUCTIONS_ED_ALL_ED_FT
IF YOU DO NOT GET YOUR RESULTS WITHIN 48 HOURS PLEASE CALL [phone].    IF YOUR RESULT COMES BACK POSITIVE:    1. STAY HOME for 10 DAYS  2. Minimize Human contact to ONLY ESSENTIAL  3. Every time you wash your hands, sing the HAPPY BIRTHDAY Song so you know you're washing long enough.  Make sure to scrub the webspace between your fingers.  4. DRINK 1-3 Liters of fluids day x at least 5 days.  To remain hydrated. Your fatigue, lightheadedness, and body aches will decrease and your fever has a better chance of breaking if you are well hydrated.    5. For your Fever and Body aches takes Tylenol 650-100mg every 4-6h (max 4000mg/day). Try not to use ibuprofen, aspirin or naproxen (Advil, Motrin or Aleve) as these may worsen Coronavirus infection.  6. RETURN TO THE ER IMMEDIATELY IF YOU HAVE WORSENING SHORTNESS OF BREATH. SYMPTOMS USUALLY PEAK BETWEEN DAY 7-10.       Urinary Tract Infection    A urinary tract infection (UTI) is an infection of any part of the urinary tract, which includes the kidneys, ureters, bladder, and urethra. Risk factors include ignoring your need to urinate, wiping back to front if female, being an uncircumcised male, and having diabetes or a weak immune system. Symptoms include frequent urination, pain or burning with urination, foul smelling urine, cloudy urine, pain in the lower abdomen, blood in the urine, and fever. If you were prescribed an antibiotic medicine, take it as told by your health care provider. Do not stop taking the antibiotic even if you start to feel better.    SEEK IMMEDIATE MEDICAL CARE IF YOU HAVE ANY OF THE FOLLOWING SYMPTOMS: severe back or abdominal pain, fever, inability to keep fluids or medicine down, dizziness/lightheadedness, or a change in mental status.

## 2022-12-17 NOTE — ED PROVIDER NOTE - OBJECTIVE STATEMENT
46 yo F, hx of chronic L sided weakness, uses a walker to ambulate for years, hx POTS, Lupus SLE, MS,  not vaccinated for covid, presents with bodyaches, nausea, chills, runny nose, and sore throat, generalized weakness and fatigue. Patient took 3 home covid tests that were positive. Notes [id number] for 2 days.     Currently managed at home with gabapentin, 46 yo F, hx of chronic L sided weakness, uses a walker to ambulate for years, hx POTS, Lupus SLE, MS, hypothyroid, asthma,  not vaccinated for covid, presents with bodyaches, nausea, chills, runny nose, and sore throat, generalized weakness and fatigue. Patient took 3 home covid tests that were positive. Notes [id number] for 2 days.     Currently managed at home with gabapentin, levothyroixne, naprosyn, albuterol, fluticasone, montelukast, metoprolol 46 yo F, hx of chronic L sided weakness, uses a walker to ambulate for years, hx POTS, Lupus SLE, MS, hypothyroid, asthma,  not vaccinated for covid, presents with bodyaches, nausea, chills, runny nose, and sore throat, generalized weakness and fatigue. Patient took 3 home covid tests that were positive. Notes symtpoms for 2 days. Otherwise denies symptoms of chest pain, shortness of breath, abdominal pain, nausea vomiting or diarrhea.  Patient notes that her urine has been dark and has been having also urinary frequency.  She denies recent admission for MS exacerbations or lupus.  She denies syncope, denies sick contacts or recent travel. Denies headache or neck stiffness     Currently managed at home with gabapentin, levothyroixne, naprosyn, albuterol, fluticasone, montelukast, metoprolol

## 2022-12-17 NOTE — ED ADULT TRIAGE NOTE - CHIEF COMPLAINT QUOTE
Pt states "I took three at home covid tests and they were all positive". Pt complaining of bodyaches and weakness. hx POTS

## 2022-12-18 VITALS
TEMPERATURE: 99 F | DIASTOLIC BLOOD PRESSURE: 74 MMHG | HEART RATE: 89 BPM | SYSTOLIC BLOOD PRESSURE: 115 MMHG | OXYGEN SATURATION: 100 % | RESPIRATION RATE: 16 BRPM

## 2022-12-18 RX ORDER — NIRMATRELVIR AND RITONAVIR 150-100 MG
1 KIT ORAL
Qty: 10 | Refills: 0
Start: 2022-12-18 | End: 2022-12-22

## 2022-12-18 RX ADMIN — Medication 500 MILLIGRAM(S): at 00:03

## 2022-12-18 NOTE — ED ADULT NURSE NOTE - OBJECTIVE STATEMENT
Patient presents to ED c/o flu like symptoms s/p covid-19 infection. Patient endorses chills, subjective fevers, and body aches. Patient additionally endorses an episode of tachycardia 12 hours ago. Denies cp, sob, cough.

## 2022-12-19 DIAGNOSIS — J45.909 UNSPECIFIED ASTHMA, UNCOMPLICATED: ICD-10-CM

## 2022-12-19 DIAGNOSIS — U07.1 COVID-19: ICD-10-CM

## 2022-12-19 DIAGNOSIS — Z88.6 ALLERGY STATUS TO ANALGESIC AGENT: ICD-10-CM

## 2022-12-19 DIAGNOSIS — M32.9 SYSTEMIC LUPUS ERYTHEMATOSUS, UNSPECIFIED: ICD-10-CM

## 2022-12-19 DIAGNOSIS — E03.9 HYPOTHYROIDISM, UNSPECIFIED: ICD-10-CM

## 2022-12-19 DIAGNOSIS — Z28.310 UNVACCINATED FOR COVID-19: ICD-10-CM

## 2022-12-19 DIAGNOSIS — N39.0 URINARY TRACT INFECTION, SITE NOT SPECIFIED: ICD-10-CM

## 2022-12-19 DIAGNOSIS — R53.1 WEAKNESS: ICD-10-CM

## 2022-12-20 LAB
-  AMPICILLIN/SULBACTAM: SIGNIFICANT CHANGE UP
-  AMPICILLIN: SIGNIFICANT CHANGE UP
-  CEFAZOLIN: SIGNIFICANT CHANGE UP
-  CEFTRIAXONE: SIGNIFICANT CHANGE UP
-  CIPROFLOXACIN: SIGNIFICANT CHANGE UP
-  ERTAPENEM: SIGNIFICANT CHANGE UP
-  GENTAMICIN: SIGNIFICANT CHANGE UP
-  NITROFURANTOIN: SIGNIFICANT CHANGE UP
-  PIPERACILLIN/TAZOBACTAM: SIGNIFICANT CHANGE UP
-  TOBRAMYCIN: SIGNIFICANT CHANGE UP
-  TRIMETHOPRIM/SULFAMETHOXAZOLE: SIGNIFICANT CHANGE UP
CULTURE RESULTS: SIGNIFICANT CHANGE UP
METHOD TYPE: SIGNIFICANT CHANGE UP
ORGANISM # SPEC MICROSCOPIC CNT: SIGNIFICANT CHANGE UP
ORGANISM # SPEC MICROSCOPIC CNT: SIGNIFICANT CHANGE UP
SPECIMEN SOURCE: SIGNIFICANT CHANGE UP

## 2023-03-28 NOTE — ED PROVIDER NOTE - INTERNATIONAL TRAVEL
No What Type Of Note Output Would You Prefer (Optional)?: Bullet Format Hpi Title: Evaluation of Skin Lesions How Severe Are Your Spot(S)?: mild Have Your Spot(S) Been Treated In The Past?: has not been treated

## 2023-06-29 NOTE — ED ADULT NURSE NOTE - CAS DISCH CONDITION
Call to Dr. Hanson. Per Dr. Valentin Kulkarni 1 hour after ifosfamide is completed due to Ifosfamide being paused for ~ 2 hours.    Stable

## 2023-07-05 ENCOUNTER — EMERGENCY (EMERGENCY)
Facility: HOSPITAL | Age: 48
LOS: 1 days | Discharge: HOME | End: 2023-07-05
Admitting: EMERGENCY MEDICINE
Payer: MEDICARE

## 2023-07-05 VITALS
DIASTOLIC BLOOD PRESSURE: 87 MMHG | OXYGEN SATURATION: 98 % | RESPIRATION RATE: 18 BRPM | SYSTOLIC BLOOD PRESSURE: 133 MMHG | HEART RATE: 90 BPM

## 2023-07-05 VITALS
HEART RATE: 90 BPM | WEIGHT: 179.02 LBS | TEMPERATURE: 98 F | RESPIRATION RATE: 18 BRPM | DIASTOLIC BLOOD PRESSURE: 75 MMHG | OXYGEN SATURATION: 97 % | HEIGHT: 66 IN | SYSTOLIC BLOOD PRESSURE: 108 MMHG

## 2023-07-05 DIAGNOSIS — E89.0 POSTPROCEDURAL HYPOTHYROIDISM: Chronic | ICD-10-CM

## 2023-07-05 DIAGNOSIS — R55 SYNCOPE AND COLLAPSE: ICD-10-CM

## 2023-07-05 DIAGNOSIS — M32.9 SYSTEMIC LUPUS ERYTHEMATOSUS, UNSPECIFIED: ICD-10-CM

## 2023-07-05 DIAGNOSIS — J45.909 UNSPECIFIED ASTHMA, UNCOMPLICATED: ICD-10-CM

## 2023-07-05 DIAGNOSIS — M54.41 LUMBAGO WITH SCIATICA, RIGHT SIDE: ICD-10-CM

## 2023-07-05 DIAGNOSIS — E03.9 HYPOTHYROIDISM, UNSPECIFIED: ICD-10-CM

## 2023-07-05 LAB
APPEARANCE UR: CLEAR — SIGNIFICANT CHANGE UP
BACTERIA # UR AUTO: PRESENT /HPF — SIGNIFICANT CHANGE UP
BILIRUB UR-MCNC: NEGATIVE — SIGNIFICANT CHANGE UP
COD CRY URNS QL: PRESENT
COLOR SPEC: YELLOW — SIGNIFICANT CHANGE UP
COMMENT - URINE: SIGNIFICANT CHANGE UP
DIFF PNL FLD: NEGATIVE — SIGNIFICANT CHANGE UP
EPI CELLS # UR: PRESENT
GLUCOSE UR QL: NEGATIVE MG/DL — SIGNIFICANT CHANGE UP
HCG UR QL: NEGATIVE — SIGNIFICANT CHANGE UP
KETONES UR-MCNC: NEGATIVE MG/DL — SIGNIFICANT CHANGE UP
LEUKOCYTE ESTERASE UR-ACNC: NEGATIVE — SIGNIFICANT CHANGE UP
NITRITE UR-MCNC: NEGATIVE — SIGNIFICANT CHANGE UP
PH UR: 5 — SIGNIFICANT CHANGE UP (ref 5–8)
PROT UR-MCNC: ABNORMAL MG/DL
RBC CASTS # UR COMP ASSIST: 0 /HPF — SIGNIFICANT CHANGE UP (ref 0–4)
SP GR SPEC: 1.04 — HIGH (ref 1–1.03)
UROBILINOGEN FLD QL: 1 MG/DL — SIGNIFICANT CHANGE UP (ref 0.2–1)
WBC UR QL: 7 /HPF — HIGH (ref 0–5)

## 2023-07-05 PROCEDURE — 99285 EMERGENCY DEPT VISIT HI MDM: CPT

## 2023-07-05 PROCEDURE — 72131 CT LUMBAR SPINE W/O DYE: CPT | Mod: 26,MH

## 2023-07-05 RX ORDER — LIDOCAINE 4 G/100G
1 CREAM TOPICAL ONCE
Refills: 0 | Status: COMPLETED | OUTPATIENT
Start: 2023-07-05 | End: 2023-07-05

## 2023-07-05 RX ORDER — DEXAMETHASONE 0.5 MG/5ML
6 ELIXIR ORAL ONCE
Refills: 0 | Status: COMPLETED | OUTPATIENT
Start: 2023-07-05 | End: 2023-07-05

## 2023-07-05 RX ORDER — METHOCARBAMOL 500 MG/1
1500 TABLET, FILM COATED ORAL ONCE
Refills: 0 | Status: COMPLETED | OUTPATIENT
Start: 2023-07-05 | End: 2023-07-05

## 2023-07-05 RX ORDER — METHOCARBAMOL 500 MG/1
2 TABLET, FILM COATED ORAL
Qty: 42 | Refills: 0
Start: 2023-07-05 | End: 2023-07-11

## 2023-07-05 RX ADMIN — LIDOCAINE 1 PATCH: 4 CREAM TOPICAL at 15:30

## 2023-07-05 RX ADMIN — METHOCARBAMOL 1500 MILLIGRAM(S): 500 TABLET, FILM COATED ORAL at 15:29

## 2023-07-05 RX ADMIN — Medication 6 MILLIGRAM(S): at 17:03

## 2023-07-05 NOTE — ED PROVIDER NOTE - PATIENT PORTAL LINK FT
You can access the FollowMyHealth Patient Portal offered by [facility] by registering at the following website: [email or web address]. By joining [facility]’s FollowMyHealth portal, you will also be able to view your health information using other applications (apps) compatible with our system.

## 2023-07-05 NOTE — ED PROVIDER NOTE - NSFOLLOWUPINSTRUCTIONS_ED_ALL_ED_FT
Please call Dr. Rob Harris to make a follow up appointment. He is a neurosurgeon that will see you in office. [phone].    Sciatica  Back view of a person showing a normal leg and a leg affected by the pain of sciatica.  Sciatica is pain, numbness, weakness, or tingling along the path of the sciatic nerve. The sciatic nerve starts in the lower back and runs down the back of each leg. The nerve controls the muscles in the lower leg and in the back of the knee. It also provides feeling (sensation) to the back of the thigh, the lower leg, and the sole of the foot. Sciatica is a symptom of another medical condition that pinches or puts pressure on the sciatic nerve.    Sciatica most often only affects one side of the body. Sciatica usually goes away on its own or with treatment. In some cases, sciatica may come back (recur).    What are the causes?  This condition is caused by pressure on the sciatic nerve or pinching of the nerve. This may be the result of:  A disk in between the bones of the spine bulging out too far (herniated disk).  Age-related changes in the spinal disks.  A pain disorder that affects a muscle in the buttock.  Extra bone growth near the sciatic nerve.  A break (fracture) of the pelvis.  Pregnancy.  Tumor. This is rare.  What increases the risk?  The following factors may make you more likely to develop this condition:  Playing sports that place pressure or stress on the spine.  Having poor strength and flexibility.  A history of back injury or surgery.  Sitting for long periods of time.  Doing activities that involve repetitive bending or lifting.  Obesity.  What are the signs or symptoms?  Symptoms can vary from mild to very severe. They may include:  Any of the following problems in the lower back, leg, hip, or buttock:  Mild tingling, numbness, or dull aches.  Burning sensations.  Sharp pains.  Numbness in the back of the calf or the sole of the foot.  Leg weakness.  Severe back pain that makes movement difficult.  Symptoms may get worse when you cough, sneeze, or laugh, or when you sit or stand for long periods of time.    How is this diagnosed?  This condition may be diagnosed based on:  Your symptoms and medical history.  A physical exam.  Blood tests.  Imaging tests, such as:  X-rays.  An MRI.  A CT scan.  How is this treated?  In many cases, this condition improves on its own without treatment. However, treatment may include:  Reducing or modifying physical activity.  Exercising, including strengthening and stretching.  Icing and applying heat to the affected area.  Medicines that help to:  Relieve pain and swelling.  Relax your muscles.  Injections of medicines that help to relieve pain and inflammation (steroids) around the sciatic nerve.  Surgery.  Follow these instructions at home:  Medicines    Take over-the-counter and prescription medicines only as told by your health care provider.  Ask your health care provider if the medicine prescribed to you requires you to avoid driving or using heavy machinery.  Managing pain    Bag of ice on a towel on the skin.  A heating pad for use on the affected area.  If directed, put ice on the affected area. To do this:  Put ice in a plastic bag.  Place a towel between your skin and the bag.  Leave the ice on for 20 minutes, 2–3 times a day.  If your skin turns bright red, remove the ice right away to prevent skin damage. The risk of skin damage is higher if you cannot feel pain, heat, or cold.  If directed, apply heat to the affected area as often as told by your health care provider. Use the heat source that your health care provider recommends, such as a moist heat pack or a heating pad.  Place a towel between your skin and the heat source.  Leave the heat on for 20–30 minutes.  If your skin turns bright red, remove the heat right away to prevent burns. The risk of burns is higher if you cannot feel pain, heat, or cold.  Activity    A comparison showing the right and wrong way to lift a heavy object.  Return to your normal activities as told by your health care provider. Ask your health care provider what activities are safe for you.  Avoid activities that make your symptoms worse.  Take brief periods of rest throughout the day.  When you rest for longer periods, mix in some mild activity or stretching between periods of rest. This will help to prevent stiffness and pain.  Avoid sitting for long periods of time without moving. Get up and move around at least one time each hour.  Exercise and stretch regularly as told by your health care provider.  Do not lift anything that is heavier than 10 lb (4.5 kg) until your health care provider says that it is safe. When you do not have symptoms, you should still avoid heavy lifting, especially repetitive heavy lifting.  When you lift objects, always use proper lifting technique, which includes:  Bending your knees.  Keeping the load close to your body.  Avoiding twisting.  General instructions    Maintain a healthy weight. Excess weight puts extra stress on your back.  Wear supportive, comfortable shoes. Avoid wearing high heels.  Avoid sleeping on a mattress that is too soft or too hard. A mattress that is firm enough to support your back when you sleep may help to reduce your pain.  Contact a health care provider if:  Your pain is not controlled by medicine.  Your pain does not improve or gets worse.  Your pain lasts longer than 4 weeks.  You have unexplained weight loss.  Get help right away if:  You are not able to control when you urinate or have bowel movements (incontinence).  You have:  Weakness in your lower back, pelvis, buttocks, or legs that gets worse.  Redness or swelling of your back.  A burning sensation when you urinate.  Summary  Sciatica is pain, numbness, weakness, or tingling along the path of the sciatic nerve, which may include the lower back, legs, hips, and buttocks.  This condition is caused by pressure on the sciatic nerve or pinching of the nerve.  Treatment often includes rest, exercise, medicines, and applying ice or heat.  This information is not intended to replace advice given to you by your health care provider. Make sure you discuss any questions you have with your health care provider.

## 2023-07-05 NOTE — ED PROVIDER NOTE - MUSCULOSKELETAL, MLM
Lumbar spine tenderness along the midline L3 to coccyx, with paraspinal tenderness in those areas as well. She has some mild tenderness along the right lateral thigh. No calf tenderness or swelling. ROM intact to bilateral lower extremities. Motor strength in right lower extremity 4+/5, left lower extremity 4-/5 (baseline). Distal pulses intact. No calf tenderness.

## 2023-07-05 NOTE — ED PROVIDER NOTE - OBJECTIVE STATEMENT
49 y/o F with PMHx of MS, lupus, hypothyroidism, anemia, and recently diagnosed sciatica, presents to ED with worsening of her pain symptoms. Pt was recently in [place] at the end of June because her friend is a doctor who recommended she come out there to be treated. Pt was give diclofenac and reports having an allergic reaction to it. After the reaction, she stopped the medication but her pain persisted and she ended up in an ED on 6/27. At that time, she was given oxycodone, lidocaine patch, steroids, and robaxin. She reports improvement in her symptoms after. She has been trying to avoid taking any further narcotics but does note some relief in symptoms with robaxin. Pt states the pain feels like it radiates down her right leg. She has chronic left lower extremity weakness due to her MS and feels as though her pain has caused her to rely more on her left side, which has worsened some of that weakness. Pt last had an MRI of her entire spine 1 year ago performed by her neurologist but does not recall any acute abnormalities. Denies any saddle anaesthesia, bowel/bladder incontinence, right lower extremity weakness, falls, injuries, or urinary symptoms.

## 2023-07-05 NOTE — ED PROVIDER NOTE - CLINICAL SUMMARY MEDICAL DECISION MAKING FREE TEXT BOX
47 y/o F presents with exacerbation of her sciatic nerve pain in her lumbar spine. Pt was recently treated and had noted some improvement with Robaxin. No new neurological findings on exam. Will plan to obtain CT imaging of lumbar spine to look for any acute abnormality. Will send UA and urine culture, and will give pain control. Dispo pending medical workup and clinical improvement.

## 2023-07-05 NOTE — ED ADULT TRIAGE NOTE - CHIEF COMPLAINT QUOTE
back pain radiating to rt leg and foot, pt seen in [facility], dx with sciatica. given Rx oxycodone, steroid and robaxin. states the robaxin is working but only has 4 pills left and is to take 2 BID. PMH Lupus MS ambulates with walker.

## 2023-07-05 NOTE — ED ADULT NURSE NOTE - NSFALLLASTSIX_ED_ALL_ED
HEARING AID RECHECK    Patient Name:  Bethany Logan    Patient Age:   76 year old    :  1942    Background:   Patient is here with daughter and  saying her right hearing aid is not working.    Procedures:   An otoscopic exam revealed clear ear canals bilaterally.  A listening check of both hearing aids revealed they had good sound quality and gain.  Patient seemed to be confused about use of the aids and she may have had a bad battery.   I gave patient 24 cells, size 312 for her 3 month supply.    Plan:   Return as needed for service.        Octaviano Galloway MA, CCC-A  [place] Licensed Audiologist #[id number]  2020         No.

## 2023-07-05 NOTE — ED PROVIDER NOTE - PROGRESS NOTE DETAILS
CT shows evidence of L5-S1 Disc extrusion, likely causing patient's pain symptoms.  She was added Decadron for pain relief and reports significant improvement.  Case discussed with on-call spine neurosurgeon who recommends outpatient follow-up with patient.  Will give patient the information to see a specialist.  Will discharge with Medrol Dosepak and Robaxin for symptoms relief.  She is given strict return precautions and is stable on discharge.

## 2023-07-05 NOTE — ED PROVIDER NOTE - NSICDXPASTMEDICALHX_GEN_ALL_CORE_FT
PAST MEDICAL HISTORY:  Anemia     Asthma     Hypothyroidism     Lupus erythematosus     Neuropathy       Sciatica

## 2023-07-05 NOTE — ED ADULT NURSE NOTE - OBJECTIVE STATEMENT
Patient with h/o sciatica presents with complaint of back pain radiating to the right leg and foot. Denies bladder or bowel incontinence. Patient with recent diagnosis of sciatica presents with complaint of worsening back pain radiating to the right leg and foot and RLE numbness and tingling sensation for weeks. Patient was treated in [place] with oxycodone, steroid and robaxin which helped but pain persisted. Denies bladder or bowel incontinence, SOB, CP, nausea, vomiting, weakness. Ambulates with walker due to MS.

## 2023-07-05 NOTE — ED ADULT NURSE NOTE - NSFALLRISKINTERV_ED_ALL_ED

## 2023-07-05 NOTE — ED PROVIDER NOTE - CARE PROVIDER_API CALL
Rob Harris.  Spine Surgery  [address] [facility] [place] [address]  Phone: [phone]  Fax: [phone]  Follow Up Time: 1-3 Days

## 2023-07-07 LAB
CULTURE RESULTS: SIGNIFICANT CHANGE UP
SPECIMEN SOURCE: SIGNIFICANT CHANGE UP

## 2023-07-12 ENCOUNTER — APPOINTMENT (OUTPATIENT)
Dept: SPINE | Facility: CLINIC | Age: 48
End: 2023-07-12
Payer: MEDICARE

## 2023-07-12 VITALS
WEIGHT: 173 LBS | BODY MASS INDEX: 27.8 KG/M2 | SYSTOLIC BLOOD PRESSURE: 136 MMHG | HEART RATE: 106 BPM | HEIGHT: 66 IN | TEMPERATURE: 98.2 F | OXYGEN SATURATION: 98 % | DIASTOLIC BLOOD PRESSURE: 90 MMHG

## 2023-07-12 DIAGNOSIS — Z86.39 PERSONAL HISTORY OF OTHER ENDOCRINE, NUTRITIONAL AND METABOLIC DISEASE: ICD-10-CM

## 2023-07-12 DIAGNOSIS — D64.9 ANEMIA, UNSPECIFIED: ICD-10-CM

## 2023-07-12 DIAGNOSIS — M54.50 LOW BACK PAIN, UNSPECIFIED: ICD-10-CM

## 2023-07-12 PROBLEM — M54.30 SCIATICA, UNSPECIFIED SIDE: Chronic | Status: ACTIVE | Noted: 2023-07-05

## 2023-07-12 PROCEDURE — 99203 OFFICE O/P NEW LOW 30 MIN: CPT

## 2023-07-12 RX ORDER — GABAPENTIN 300 MG/1
300 CAPSULE ORAL 3 TIMES DAILY
Qty: 90 | Refills: 2 | Status: ACTIVE | COMMUNITY
Start: 2023-07-12 | End: 1900-01-01

## 2023-07-13 ENCOUNTER — APPOINTMENT (OUTPATIENT)
Dept: MRI IMAGING | Facility: CLINIC | Age: 48
End: 2023-07-13

## 2023-07-15 NOTE — HISTORY OF PRESENT ILLNESS
[de-identified] : The patient is a 48-year-old woman who presents the office with complaints of severe low back pain and lower extremity muscle spasms beginning approximately 2 months ago.  She states that she has a history of postural orthostatic hypotension and fell because of lightheadedness.  She states that the pain begins in her lower back and radiates through the posterior lateral aspect of her bilateral lower extremities into the calves and the bottom of the feet.  She states that the pain is constant and is made worse with any types of movement or ambulation.  She was seen in the emergency department and prescribed narcotic medications.  She states that she has now run out of this medication.

## 2023-07-15 NOTE — PLAN
[FreeTextEntry1] : Given the patient's symptoms and findings on CT scan, I recommended that the patient have an MRI scan of her lumbar spine in order to evaluate for other acute events that may have exacerbated her symptoms.  I also recommended the patient be seen by a pain management physician as she is requesting medications for her continued pain.  We increased her gabapentin to 300 mg 3 times a day, however we are unable to prescribe narcotics or other medications as the patient states she is allergic to nonsteroidal anti-inflammatories.  She will follow-up once her MRI is completed.

## 2023-07-15 NOTE — PHYSICAL EXAM
[General Appearance - Alert] : alert [Person] : oriented to person [Place] : oriented to place [Time] : oriented to time [Short Term Intact] : short term memory intact [Remote Intact] : remote memory intact [Span Intact] : the attention span was normal [Concentration Intact] : normal concentrating ability [Fluency] : fluency intact [Comprehension] : comprehension intact [Current Events] : adequate knowledge of current events [Past History] : adequate knowledge of personal past history [Vocabulary] : adequate range of vocabulary [Cranial Nerves Optic (II)] : visual acuity intact bilaterally,  pupils equal round and reactive to light [Cranial Nerves Oculomotor (III)] : extraocular motion intact [Cranial Nerves Trigeminal (V)] : facial sensation intact symmetrically [Cranial Nerves Facial (VII)] : face symmetrical [Cranial Nerves Vestibulocochlear (VIII)] : hearing was intact bilaterally [Cranial Nerves Glossopharyngeal (IX)] : tongue and palate midline [Cranial Nerves Accessory (XI - Cranial And Spinal)] : head turning and shoulder shrug symmetric [Cranial Nerves Hypoglossal (XII)] : there was no tongue deviation with protrusion [Motor Tone] : muscle tone was normal in all four extremities [Motor Strength] : muscle strength was normal in all four extremities [No Muscle Atrophy] : normal bulk in all four extremities [Sensation Tactile Decrease] : light touch was intact [Abnormal Walk] : normal gait [Balance] : balance was intact [Past-pointing] : there was no past-pointing [Tremor] : no tremor present [2+] : Patella left 2+ [L'Hermitte's] : neck flexion did not produce tingling down the spine/arms [Spurling's - Opposite Side] : Negative Spurling's on opposite side [Spurling's Same Side] : Negative Spurling's on same side [No Visual Abnormalities] : no visible abnormailities [No Tenderness to Palpation] : no spine tenderness on palpation [Full ROM] : full ROM [No Pain with ROM] : no pain with motion in any direction [Straight-Leg Raise Test - Left] : straight leg raise of the left leg was negative [Straight-Leg Raise Test - Right] : straight leg raise  of the right leg was negative [] : negative on the right [Normal] : normal [Intact] : all reflexes within normal limits bilaterally

## 2023-07-15 NOTE — ASSESSMENT
[FreeTextEntry1] : The patient is a 48-year-old woman with low back pain and lower extremity radicular symptoms in the presence of a neuro inflammatory disorder.  The patient only has a CAT scan to review today and this is a nonoptimal study for her symptoms.

## 2023-07-15 NOTE — RESULTS/DATA
[FreeTextEntry1] : The patient has a CT scan of her lumbar spine available for review today.  This scan shows multiple areas of spondylosis and a somewhat calcified disc herniation at L5-S1.  There is no evidence of acute fracture.  There is no there is no evidence of spondylolisthesis or other instability.

## 2023-07-15 NOTE — DATA REVIEWED
[de-identified] : CT L spine\par R paracentral/subarticular disc extrusion L5-S1; correlate for S1 radiculopathy. Mild spinal stenosis at this level and also L4-L5\par No fracture or malalignment

## 2023-07-18 ENCOUNTER — APPOINTMENT (OUTPATIENT)
Dept: MRI IMAGING | Facility: CLINIC | Age: 48
End: 2023-07-18

## 2023-07-19 ENCOUNTER — APPOINTMENT (OUTPATIENT)
Dept: PAIN MANAGEMENT | Facility: CLINIC | Age: 48
End: 2023-07-19

## 2023-07-23 ENCOUNTER — OUTPATIENT (OUTPATIENT)
Dept: OUTPATIENT SERVICES | Facility: HOSPITAL | Age: 48
LOS: 1 days | End: 2023-07-23

## 2023-07-23 ENCOUNTER — APPOINTMENT (OUTPATIENT)
Dept: MRI IMAGING | Facility: CLINIC | Age: 48
End: 2023-07-23
Payer: MEDICARE

## 2023-07-23 DIAGNOSIS — E89.0 POSTPROCEDURAL HYPOTHYROIDISM: Chronic | ICD-10-CM

## 2023-07-23 PROCEDURE — 72148 MRI LUMBAR SPINE W/O DYE: CPT | Mod: 26,MH

## 2023-08-02 ENCOUNTER — APPOINTMENT (OUTPATIENT)
Dept: SPINE | Facility: CLINIC | Age: 48
End: 2023-08-02

## 2023-08-08 ENCOUNTER — APPOINTMENT (OUTPATIENT)
Dept: SPINE | Facility: CLINIC | Age: 48
End: 2023-08-08
Payer: MEDICARE

## 2023-08-08 PROCEDURE — 99203 OFFICE O/P NEW LOW 30 MIN: CPT | Mod: 95

## 2023-08-08 PROCEDURE — 99213 OFFICE O/P EST LOW 20 MIN: CPT

## 2023-08-15 NOTE — HISTORY OF PRESENT ILLNESS
[FreeTextEntry1] : The patient is a 48-year-old woman with PMHx MS, POTS, Multiple falls who originally presented the office  on 7/12/2023 with complaints of severe low back pain and lower extremity muscle spasms beginning approximately 2 months ago. She states that she has a history of postural orthostatic hypotension and fell because of lightheadedness. She states that the pain begins in her lower back and radiates through the posterior lateral aspect of her bilateral lower extremities into the calves and the bottom of the feet. She states that the pain is constant and is made worse with any types of movement or ambulation. She was seen in the emergency department and prescribed narcotic medications. She states that she has now run out of this medication. Pt is allergic to NSAIDS.   At this time, an MRI was ordered. Pts Gabapentin was increased to 300mg 3xday and she was referred to a pain management specialist. Plan to follow up once MRI imaging is obtained to discuss results and plan of care.   TODAY 8/8/2023 pt participating in a TeleHealth follow-up visit with Dr. Harris to discuss MRI results ( MR Lumbar Spine No cont completed at [facility] on 7/23/2023). Pt states she continues to have symptoms as previously endorsed (RLE/foot numbness, lower back pain right>left with radiation to right buttock described as sharp and shooting). She continues using a walker for ambulation. Pt states that in the interim she saw a pain management specialist who is not within our hospital system.   MR Lumbar Spine wo Cont from 7/23/2023 reviewed by Dr. Harris which demonstrates a large disc herniation/fragmented disc at L5-S1. Given patients symptoms, exam and imaging, surgical intervention deemed necessary at this time. Dr. Harris discussed imaging with pt as well as the details of Microdiscectomy surgery. Pt was given the opportunity to ask questions and had any/all inquiries addressed. She wishes to go forward with surgery at this time.  Plan: Schedule L5-S1 Microdiscectomy with Dr. Harris Email patient medical clearance information

## 2023-08-15 NOTE — PHYSICAL EXAM
[General Appearance - Alert] : alert [General Appearance - In No Acute Distress] : in no acute distress [Oriented To Time, Place, And Person] : oriented to person, place, and time [Person] : oriented to person [Place] : oriented to place [Time] : oriented to time [Sclera] : the sclera and conjunctiva were normal [Hearing Threshold Finger Rub Not [place]] : hearing was normal [] : no respiratory distress [Respiration, Rhythm And Depth] : normal respiratory rhythm and effort [Skin Color & Pigmentation] : normal skin color and pigmentation [FreeTextEntry1] : Physical exam limited d/t TeleHealth visit

## 2023-08-15 NOTE — REASON FOR VISIT
[Follow-Up: _____] : a [unfilled] follow-up visit [FreeTextEntry1] : This visit was provided via teleheatl using real-time 2 way audio visual technology. Verbal consent was given on 8/8/2023 by the patient. The patient, XOCHILT ALICIA was located at her home at the time of visit The provider, Dr. Silvia Rogers was located at the medical office at the time of visit. XOCHILT VARGAS and DR SILVIA ROGERS participated in the telehealth encounter.

## 2023-08-15 NOTE — DATA REVIEWED
[de-identified] : EXAM: [id number] - MR SPINE LUMBAR  - ORDERED BY: AYLEEN LAKHANI   PROCEDURE DATE:  07/23/2023    INTERPRETATION:  CLINICAL INFORMATION: Back pain  ADDITIONAL CLINICAL INFORMATION: Low back muscle strain S39.012A  TECHNIQUE: Multiplanar, multisequence MRI was performed of the lumbar spine. IV Contrast: NONE  PRIOR STUDIES: CT lumbar spine 7/5/2023  FINDINGS:  LOCALIZER: No additional findings. BONES: There is diffuse heterogeneity of the bone marrow signal which likely due to patient's history of anemia. There is no bone marrow edema. The vertebral body heights are maintained. ALIGNMENT: There is mild dextrocurvature of the lumbar spine. SACROILIAC JOINTS/SACRUM: There is no sacral fracture. The SI joints are partially visualized but are intact. CONUS AND CAUDA EQUINA: The distal cord and conus are normal in signal. Conus terminates at L1. VISUALIZED INTRAPELVIC/INTRA-ABDOMINAL SOFT TISSUES: Normal. PARASPINAL SOFT TISSUES: Normal.   INDIVIDUAL LEVELS:  LOWER THORACIC SPINE: No spinal canal or neuroforaminal stenosis.  L1-L2: No spinal canal or neuroforaminal stenosis. L2-L3: No spinal canal or neuroforaminal stenosis. L3-L4: There is a disc bulge with facet hypertrophy without significant spinal canal stenosis or neural foraminal narrowing. L4-L5: There is a disc bulge with facet and ligamentum flavum hypertrophy resulting in mild spinal canal stenosis without significant neural foraminal narrowing L5-S1: There is a right paracentral/subarticular disc herniation with inferior migration and focal central disc herniation. There is right lateral recess narrowing with mass effect on the descending right S1 nerve root. There is mild to moderate bilateral neural foraminal narrowing.   IMPRESSION:  L5-S1 right paracentral/subarticular disc herniation resulting in right lateral recess narrowing with mass effect and descending right S1 nerve root corresponding to findings on recent CT. Correlate with patient's symptoms.  --- End of Report ---       JEIMY ZURITA MD; Attending Radiologist This document has been electronically signed. Jul 24 2023  3:14PM

## 2023-08-15 NOTE — ADDENDUM
[FreeTextEntry1] : The patient has severe pain that has been unresponsive to conservative therapy, including NSAIDs, PT, lifestyle modifications, rest and narcotic medications.  She has a large disc herniation that correlates with her symptoms.  Therefore, I feel that she would benefit from a procedure to decompress the neural elements.  Risks, benefits and alternatives of a microdiscectomy were discussed.  The patient will contact the office if she wishes to proceed.

## 2023-09-27 ENCOUNTER — APPOINTMENT (OUTPATIENT)
Dept: SPINE | Facility: CLINIC | Age: 48
End: 2023-09-27
Payer: MEDICARE

## 2023-09-27 DIAGNOSIS — M54.16 RADICULOPATHY, LUMBAR REGION: ICD-10-CM

## 2023-09-27 PROCEDURE — 99213 OFFICE O/P EST LOW 20 MIN: CPT | Mod: 95

## 2023-09-28 PROBLEM — M54.16 LUMBAR RADICULOPATHY: Status: ACTIVE | Noted: 2023-07-12

## 2023-10-04 ENCOUNTER — APPOINTMENT (OUTPATIENT)
Dept: SPINE | Facility: CLINIC | Age: 48
End: 2023-10-04

## 2023-11-09 ENCOUNTER — NON-APPOINTMENT (OUTPATIENT)
Age: 48
End: 2023-11-09

## 2023-12-13 PROBLEM — G90.1 DYSAUTONOMIA: Status: RESOLVED | Noted: 2023-07-12 | Resolved: 2023-12-13

## 2023-12-13 PROBLEM — Z86.79 HISTORY OF SINUS TACHYCARDIA: Status: RESOLVED | Noted: 2023-07-12 | Resolved: 2023-12-13

## 2023-12-13 PROBLEM — Z86.73 HISTORY OF TRANSIENT ISCHEMIC ATTACK: Status: RESOLVED | Noted: 2023-07-12 | Resolved: 2023-12-13

## 2023-12-14 ENCOUNTER — NON-APPOINTMENT (OUTPATIENT)
Age: 48
End: 2023-12-14

## 2023-12-14 ENCOUNTER — APPOINTMENT (OUTPATIENT)
Dept: HEART AND VASCULAR | Facility: CLINIC | Age: 48
End: 2023-12-14
Payer: MEDICARE

## 2023-12-14 VITALS
OXYGEN SATURATION: 97 % | SYSTOLIC BLOOD PRESSURE: 133 MMHG | WEIGHT: 164.44 LBS | TEMPERATURE: 96 F | DIASTOLIC BLOOD PRESSURE: 94 MMHG | HEART RATE: 101 BPM | HEIGHT: 66 IN | BODY MASS INDEX: 26.43 KG/M2

## 2023-12-14 DIAGNOSIS — Z86.79 PERSONAL HISTORY OF OTHER DISEASES OF THE CIRCULATORY SYSTEM: ICD-10-CM

## 2023-12-14 DIAGNOSIS — M32.9 SYSTEMIC LUPUS ERYTHEMATOSUS, UNSPECIFIED: ICD-10-CM

## 2023-12-14 DIAGNOSIS — G90.A POSTURAL ORTHOSTATIC TACHYCARDIA SYNDROME [POTS]: ICD-10-CM

## 2023-12-14 DIAGNOSIS — Z01.810 ENCOUNTER FOR PREPROCEDURAL CARDIOVASCULAR EXAMINATION: ICD-10-CM

## 2023-12-14 DIAGNOSIS — Z86.73 PERSONAL HISTORY OF TRANSIENT ISCHEMIC ATTACK (TIA), AND CEREBRAL INFARCTION W/OUT RESIDUAL DEFICITS: ICD-10-CM

## 2023-12-14 DIAGNOSIS — G90.1 FAMILIAL DYSAUTONOMIA [RILEY-DAY]: ICD-10-CM

## 2023-12-14 DIAGNOSIS — G35 MULTIPLE SCLEROSIS: ICD-10-CM

## 2023-12-14 PROCEDURE — 93000 ELECTROCARDIOGRAM COMPLETE: CPT

## 2023-12-14 PROCEDURE — 99215 OFFICE O/P EST HI 40 MIN: CPT | Mod: 25

## 2023-12-14 RX ORDER — FERROUS SULFATE 325(65) MG
325 TABLET ORAL
Refills: 0 | Status: ACTIVE | COMMUNITY

## 2023-12-14 RX ORDER — LEVOTHYROXINE SODIUM 0.12 MG/1
125 TABLET ORAL
Refills: 0 | Status: ACTIVE | COMMUNITY

## 2023-12-14 RX ORDER — METHOCARBAMOL 750 MG/1
750 TABLET, FILM COATED ORAL
Refills: 0 | Status: ACTIVE | COMMUNITY

## 2023-12-14 RX ORDER — MONTELUKAST 10 MG/1
10 TABLET, FILM COATED ORAL
Refills: 0 | Status: ACTIVE | COMMUNITY

## 2023-12-14 RX ORDER — ALBUTEROL SULFATE 90 UG/1
108 (90 BASE) AEROSOL, METERED RESPIRATORY (INHALATION)
Refills: 0 | Status: ACTIVE | COMMUNITY

## 2023-12-14 RX ORDER — METOPROLOL SUCCINATE 50 MG/1
50 TABLET, EXTENDED RELEASE ORAL
Qty: 90 | Refills: 3 | Status: ACTIVE | COMMUNITY
Start: 1900-01-01 | End: 1900-01-01

## 2023-12-14 RX ORDER — TRAMADOL HYDROCHLORIDE 50 MG/1
50 TABLET, COATED ORAL
Refills: 0 | Status: ACTIVE | COMMUNITY

## 2023-12-18 NOTE — REASON FOR VISIT
[Other: ____] : [unfilled] [FreeTextEntry1] : ======================================================================= Diagnostic Tests: -------------------------------------------------------------- EC23: sinus rhythm, normal ECG.  -------------------------------------------------------------- Echo: 22: EF 56%, mild LVH.  -------------------------------------------------------------- Lower extremity veins: 22: sono: no DVT.  -------------------------------------------------------------- CT: 22: CT PE: no PE.

## 2023-12-18 NOTE — ASSESSMENT
[FreeTextEntry1] : ======================================================================================= 1. Postural orthostatic tachycardia syndrome (POTS):     - oral fluid intake should be encouraged to a target of 3 L daily and a daily salt intake of 8 to 12 g of sodium chloride (3.2 to 4.8 g of sodium)     - patients with POTS benefit from an incremental program of aerobic exercise. Many patients with POTS are physically deconditioned and others are at risk for deconditioning by restricting their physical activity to manage POTS symptoms       - encourage use of venous compression stockings  2. Preoperative for TEES with Dougie Lara MD on 12/20/23, possibly followed at a later date by microdiscectomy with Rob Harris MD, (Dougie Lara MD, fax [phone]):      - will send for a CCTA to rule out underlying CAD    I spent > 45 minutes of total time on this visit, including time spent face-to-face and non-face-to-face.  During this time, I took a relevant history and examined the patient.  I reviewed relevant portions of the medical record and formulated a differential diagnosis and plan.  I explained the relevant cardiac diagnoses to the patient, as well as the work up and management plan.  I answered all questions related to the patient's cardiac conditions.

## 2023-12-18 NOTE — HISTORY OF PRESENT ILLNESS
[FreeTextEntry1] : Ms. Salcedo presents for initial evaluation and management of anemia, dysautonomia (POTS), hypothyroidism, multiple sclerosis, systemic lupus erythematosus, TIA, multiple syncopal episodes, and lumbar disc disease.  She was previously followed by another cardiologist, Orestes Roger MD at [facility] but is no longer able to make the commute to the [facility].  She is trying to unify care at [facility]. She has a history of POTS s/p multiple falls. She has a large disc herniation and fragmented disc at L5-S1 and is preoperative for microdiscectomy with Rob Harris MD at some point in the future.  At present, she is preoperative for right TEES with MAC sedation with Dougie South MD on 12/20/23.  Her functional capacity is markedly reduced secondary to her chronic medical conditions.

## 2023-12-18 NOTE — ADDENDUM
[FreeTextEntry1] : 12/18/23.  Ms. Salcedo is scheduled for right TEES with MAC sedation with Dougie Lara MD on 12/20/23.  She denies chest pain and has no active cardiac complaints.  Given the urgency of the procedure to try to affect adequate pain control and her overall low cardiac risk, will clear her at this time to proceed.

## 2024-01-02 ENCOUNTER — APPOINTMENT (OUTPATIENT)
Dept: CT IMAGING | Facility: CLINIC | Age: 49
End: 2024-01-02

## 2024-04-09 ENCOUNTER — APPOINTMENT (OUTPATIENT)
Dept: HEART AND VASCULAR | Facility: CLINIC | Age: 49
End: 2024-04-09

## 2024-10-15 ENCOUNTER — EMERGENCY (EMERGENCY)
Facility: HOSPITAL | Age: 49
LOS: 1 days | Discharge: HOME | End: 2024-10-15
Attending: EMERGENCY MEDICINE | Admitting: EMERGENCY MEDICINE
Payer: MEDICARE

## 2024-10-15 VITALS
RESPIRATION RATE: 18 BRPM | SYSTOLIC BLOOD PRESSURE: 141 MMHG | HEART RATE: 71 BPM | OXYGEN SATURATION: 99 % | WEIGHT: 179.9 LBS | HEIGHT: 65 IN | DIASTOLIC BLOOD PRESSURE: 79 MMHG | TEMPERATURE: 98 F

## 2024-10-15 VITALS — HEART RATE: 100 BPM

## 2024-10-15 LAB — GLUCOSE BLDC GLUCOMTR-MCNC: 120 MG/DL — HIGH (ref 70–99)

## 2024-10-15 RX ORDER — OXYCODONE HYDROCHLORIDE 30 MG/1
5 TABLET, FILM COATED, EXTENDED RELEASE ORAL ONCE
Refills: 0 | Status: DISCONTINUED | OUTPATIENT
Start: 2024-10-15 | End: 2024-10-15

## 2024-10-15 RX ORDER — OXYCODONE HYDROCHLORIDE 30 MG/1
1 TABLET, FILM COATED, EXTENDED RELEASE ORAL
Qty: 9 | Refills: 0
Start: 2024-10-15 | End: 2024-10-17

## 2024-10-15 RX ADMIN — OXYCODONE HYDROCHLORIDE 5 MILLIGRAM(S): 30 TABLET, FILM COATED, EXTENDED RELEASE ORAL at 19:33

## 2024-10-15 NOTE — ED ADULT NURSE NOTE - OBJECTIVE STATEMENT
Patient presents with complaints of near syncope episode and neck pain. Patient with h/o stroke and has left sided weakness. Patient with h/o POTS and stroke with left sided weakness presents with complaints of near syncope episode while in post office, didn't fall or loss consciousness, c/o neck pain. Patient is ambulatory with walker. Denies nausea, vomiting, CP, SOB, dizziness, increasing weakness, headache, confusion.

## 2024-10-15 NOTE — ED PROVIDER NOTE - CLINICAL SUMMARY MEDICAL DECISION MAKING FREE TEXT BOX
HPI - this is a female pt, hx of POTS and prior stroke w R sided residual L sided weakness. Patient was running in a RN at the mail station and she felt near syncopal which is quite similar to prior episodes when her POTS gets exacerbated.  Felt tachycardic and near syncopal as she had to strain to get to the area as the elevators were not working.  Patient denies any chest pain or shortness of breath.  Patient did suffer a fall this morning which is not unusual for her given that she has no mobility issues and she has some left-sided neck pain.    EKG is a normal sinus rhythm with a heart rate of 100, no ST-T changes.    On examination patient is alert and oriented and able to viral history.  Lung sounds normal, heart sounds normal, abdomen nontender.  Patient with 4 out of 5 strength in left upper extremity which patient feels this is her baseline.  No midline cervical tenderness.  Left-sided paraspinal tenderness with no limitation of range of motion.    MDM will provide analgesia for neck pain. pt has been in her baseline during er stay.

## 2024-10-15 NOTE — ED ADULT NURSE NOTE - NSFALLHARMRISKINTERV_ED_ALL_ED

## 2024-10-15 NOTE — ED PROVIDER NOTE - PATIENT PORTAL LINK FT
You can access the FollowMyHealth Patient Portal offered by [facility] [place] by registering at the following website: [email or web address]. By joining [facility] FollowMyHealth portal, you will also be able to view your health information using other applications (apps) compatible with our system.

## 2024-10-18 DIAGNOSIS — G90.A POSTURAL ORTHOSTATIC TACHYCARDIA SYNDROME [POTS]: ICD-10-CM

## 2024-10-18 DIAGNOSIS — R55 SYNCOPE AND COLLAPSE: ICD-10-CM
